# Patient Record
Sex: FEMALE | Race: WHITE | Employment: UNEMPLOYED | URBAN - NONMETROPOLITAN AREA
[De-identification: names, ages, dates, MRNs, and addresses within clinical notes are randomized per-mention and may not be internally consistent; named-entity substitution may affect disease eponyms.]

---

## 2021-08-02 ENCOUNTER — HOSPITAL ENCOUNTER (OUTPATIENT)
Dept: GENERAL RADIOLOGY | Age: 56
Discharge: HOME | End: 2021-08-02
Payer: COMMERCIAL

## 2021-08-02 ENCOUNTER — HOSPITAL ENCOUNTER (OUTPATIENT)
Age: 56
Discharge: HOME | End: 2021-08-02
Payer: COMMERCIAL

## 2021-08-02 DIAGNOSIS — R10.9 ACUTE ABDOMINAL PAIN: ICD-10-CM

## 2021-08-02 PROCEDURE — 74022 RADEX COMPL AQT ABD SERIES: CPT

## 2021-08-28 ENCOUNTER — HOSPITAL ENCOUNTER (OUTPATIENT)
Dept: CT IMAGING | Age: 56
Discharge: HOME | End: 2021-08-28

## 2021-08-28 DIAGNOSIS — R19.00 MASS OF PELVIS: ICD-10-CM

## 2021-08-28 PROCEDURE — 74177 CT ABD & PELVIS W/CONTRAST: CPT

## 2021-08-28 PROCEDURE — 6360000004 HC RX CONTRAST MEDICATION: Performed by: FAMILY MEDICINE

## 2021-08-28 RX ADMIN — IOHEXOL 50 ML: 240 INJECTION, SOLUTION INTRATHECAL; INTRAVASCULAR; INTRAVENOUS; ORAL at 10:32

## 2021-08-28 RX ADMIN — IOPAMIDOL 85 ML: 755 INJECTION, SOLUTION INTRAVENOUS at 10:32

## 2022-01-31 ENCOUNTER — TELEPHONE (OUTPATIENT)
Dept: FAMILY MEDICINE CLINIC | Age: 57
End: 2022-01-31

## 2022-01-31 NOTE — TELEPHONE ENCOUNTER
----- Message from Wagner Ahuja sent at 1/31/2022  1:11 PM EST -----  Subject: Appointment Request    Reason for Call: Routine Physical Exam with PAP    QUESTIONS  Type of Appointment? New Patient/New to Provider  Reason for appointment request? No appointments available during search  Additional Information for Provider? looking to establish care has   medicaid and would like to find some one - in her area she has called   around and would like a call back and try to get her in asap.   ---------------------------------------------------------------------------  --------------  [address]  What is the best way for the office to contact you? OK to leave message on   voicemail  Preferred Call Back Phone Number? [phone]  ---------------------------------------------------------------------------  --------------  SCRIPT ANSWERS  Relationship to Patient? Self  Specialty Confirmation? Primary Care  (If the patient has Medicare as their primary insurance coverage ask this   question) Are you requesting a Medicare Annual Wellness Visit? No  (Is the patient requesting a pap smear with their physical exam?)? Yes  (Is the patient requesting their annual physical and does not need PAP or   AWV per above?)? No  Have you been diagnosed with, awaiting test results for, or told that you   are suspected of having COVID-19 (Coronavirus)? (If patient has tested   negative or was tested as a requirement for work, school, or travel and   not based on symptoms, answer no)? No  Within the past two weeks have you developed any of the following symptoms   (answer no if symptoms have been present longer than 2 weeks or began   more than 2 weeks ago)? Fever or Chills, Cough, Shortness of breath or   difficulty breathing, Loss of taste or smell, Sore throat, Nasal   congestion, Sneezing or runny nose, Fatigue or generalized body aches   (answer no if pain is specific to a body part e.g. back pain), Diarrhea,   Headache?  No  Have you had close contact with someone with COVID-19 in the last 14 days? No  (Service Expert  click yes below to proceed with "[employer] As Usual   Scheduling)?  Yes

## 2022-02-01 ENCOUNTER — TELEPHONE (OUTPATIENT)
Dept: FAMILY MEDICINE CLINIC | Age: 57
End: 2022-02-01

## 2022-02-01 NOTE — TELEPHONE ENCOUNTER
Future Appointments   Date Time Provider [facility]   2/15/2022  1:00 PM DO RON [facility]

## 2022-02-01 NOTE — TELEPHONE ENCOUNTER
----- Message from Mami Milian sent at 1/31/2022  1:56 PM EST -----  Subject: Message to Provider    QUESTIONS  Information for Provider? Patient wants to know if Raffi Henry DO at   [facility] accepts her [address], insurance says there   should be no copay for pcp visits, tried to input [id number] [facility] in   chart but not responding correctly -- patient will bring info to visit   ---------------------------------------------------------------------------  --------------  [address]  What is the best way for the office to contact you? OK to leave message on   voicemail  Preferred Call Back Phone Number? [phone]  ---------------------------------------------------------------------------  --------------  SCRIPT ANSWERS  Relationship to Patient?  Self

## 2022-02-01 NOTE — TELEPHONE ENCOUNTER
Spoke with pt to inform her we do take [facility]. Requested pt to check with her insurance to be sure Dr. Sarmad Jacobs is on her plan.

## 2022-02-15 ENCOUNTER — OFFICE VISIT (OUTPATIENT)
Dept: FAMILY MEDICINE CLINIC | Age: 57
End: 2022-02-15
Payer: COMMERCIAL

## 2022-02-15 VITALS
RESPIRATION RATE: 16 BRPM | WEIGHT: 139.6 LBS | HEIGHT: 60 IN | BODY MASS INDEX: 27.41 KG/M2 | HEART RATE: 88 BPM | SYSTOLIC BLOOD PRESSURE: 120 MMHG | DIASTOLIC BLOOD PRESSURE: 68 MMHG | TEMPERATURE: 97.3 F | OXYGEN SATURATION: 99 %

## 2022-02-15 DIAGNOSIS — R10.12 LEFT UPPER QUADRANT ABDOMINAL PAIN: ICD-10-CM

## 2022-02-15 DIAGNOSIS — A04.72 C. DIFFICILE COLITIS: Primary | ICD-10-CM

## 2022-02-15 PROCEDURE — G8427 DOCREV CUR MEDS BY ELIG CLIN: HCPCS | Performed by: FAMILY MEDICINE

## 2022-02-15 PROCEDURE — 1036F TOBACCO NON-USER: CPT | Performed by: FAMILY MEDICINE

## 2022-02-15 PROCEDURE — 99204 OFFICE O/P NEW MOD 45 MIN: CPT | Performed by: FAMILY MEDICINE

## 2022-02-15 PROCEDURE — G8419 CALC BMI OUT NRM PARAM NOF/U: HCPCS | Performed by: FAMILY MEDICINE

## 2022-02-15 PROCEDURE — G8484 FLU IMMUNIZE NO ADMIN: HCPCS | Performed by: FAMILY MEDICINE

## 2022-02-15 PROCEDURE — 3017F COLORECTAL CA SCREEN DOC REV: CPT | Performed by: FAMILY MEDICINE

## 2022-02-15 RX ORDER — METRONIDAZOLE 500 MG/1
500 TABLET ORAL 3 TIMES DAILY
Qty: 30 TABLET | Refills: 0 | Status: SHIPPED | OUTPATIENT
Start: 2022-02-15 | End: 2022-02-15 | Stop reason: SDUPTHER

## 2022-02-15 RX ORDER — METRONIDAZOLE 500 MG/1
500 TABLET ORAL 3 TIMES DAILY
Qty: 30 TABLET | Refills: 0 | Status: SHIPPED | OUTPATIENT
Start: 2022-02-15 | End: 2022-02-25

## 2022-02-15 RX ORDER — PANTOPRAZOLE SODIUM 40 MG/1
40 GRANULE, DELAYED RELEASE ORAL
COMMUNITY

## 2022-02-15 SDOH — ECONOMIC STABILITY: FOOD INSECURITY: WITHIN THE PAST 12 MONTHS, YOU WORRIED THAT YOUR FOOD WOULD RUN OUT BEFORE YOU GOT MONEY TO BUY MORE.: SOMETIMES TRUE

## 2022-02-15 SDOH — ECONOMIC STABILITY: FOOD INSECURITY: WITHIN THE PAST 12 MONTHS, THE FOOD YOU BOUGHT JUST DIDN'T LAST AND YOU DIDN'T HAVE MONEY TO GET MORE.: SOMETIMES TRUE

## 2022-02-15 ASSESSMENT — PATIENT HEALTH QUESTIONNAIRE - PHQ9
2. FEELING DOWN, DEPRESSED OR HOPELESS: 0
SUM OF ALL RESPONSES TO PHQ QUESTIONS 1-9: 0
1. LITTLE INTEREST OR PLEASURE IN DOING THINGS: 0
SUM OF ALL RESPONSES TO PHQ QUESTIONS 1-9: 0
SUM OF ALL RESPONSES TO PHQ9 QUESTIONS 1 & 2: 0

## 2022-02-15 ASSESSMENT — SOCIAL DETERMINANTS OF HEALTH (SDOH): HOW HARD IS IT FOR YOU TO PAY FOR THE VERY BASICS LIKE FOOD, HOUSING, MEDICAL CARE, AND HEATING?: SOMEWHAT HARD

## 2022-02-15 NOTE — PROGRESS NOTES
visit: C. difficile colitis  -     Discontinue: metroNIDAZOLE (FLAGYL) 500 MG tablet; Take 1 tablet by mouth 3 times daily for 10 days  -     metroNIDAZOLE (FLAGYL) 500 MG tablet; Take 1 tablet by mouth 3 times daily for 10 days    Left upper quadrant abdominal pain        Return in about 6 weeks (around 3/29/2022).     -Sx consistent with adhesions vs gastritis vs IBS vs other  -start above treatments and follow up with GI  -ER precautions given today  -Patient advised to contact our office immediately if symptoms worsen or persist  -Patient counseled on conservative care including PO intake, rest and OTC meds

## 2022-02-16 ENCOUNTER — TELEPHONE (OUTPATIENT)
Dept: FAMILY MEDICINE CLINIC | Age: 57
End: 2022-02-16

## 2022-02-16 NOTE — TELEPHONE ENCOUNTER
Pt called in very frustrated and stated     \" I have never been to [facility] for my medications. I want to know how they have all my information for [facility]. I told them I want the Metronidazole canceled there and sent to [facility]. I had to tpay out of pocket at [facility] because [facility] still filled the medication and [facility] covered it through [facility]. I want it canceled at [facility] and [facility] refunded. I am pretty pissed because I am out the money now and can not get the money back that I paid for this medication.  \"       I informed pt that it looked like it was discontinued for [facility] and sent to [facility] and she stated \"that someone needs to call and tell them that it needs to be canceld and refund [employer]\"    Please advise

## 2022-02-23 NOTE — TELEPHONE ENCOUNTER
I spoke with the pharmacy at [facility] and it was advised that the prescription sent there was cancelled and that it did not bill her insurance.

## 2022-04-01 ENCOUNTER — OFFICE VISIT (OUTPATIENT)
Dept: FAMILY MEDICINE CLINIC | Age: 57
End: 2022-04-01
Payer: COMMERCIAL

## 2022-04-01 VITALS
WEIGHT: 139.4 LBS | DIASTOLIC BLOOD PRESSURE: 78 MMHG | SYSTOLIC BLOOD PRESSURE: 118 MMHG | BODY MASS INDEX: 27.37 KG/M2 | HEIGHT: 60 IN | RESPIRATION RATE: 14 BRPM | TEMPERATURE: 97.3 F | OXYGEN SATURATION: 98 % | HEART RATE: 96 BPM

## 2022-04-01 DIAGNOSIS — R10.12 LEFT UPPER QUADRANT ABDOMINAL PAIN: Primary | ICD-10-CM

## 2022-04-01 PROCEDURE — 99213 OFFICE O/P EST LOW 20 MIN: CPT | Performed by: FAMILY MEDICINE

## 2022-04-01 PROCEDURE — 1036F TOBACCO NON-USER: CPT | Performed by: FAMILY MEDICINE

## 2022-04-01 PROCEDURE — G8427 DOCREV CUR MEDS BY ELIG CLIN: HCPCS | Performed by: FAMILY MEDICINE

## 2022-04-01 PROCEDURE — G8419 CALC BMI OUT NRM PARAM NOF/U: HCPCS | Performed by: FAMILY MEDICINE

## 2022-04-01 PROCEDURE — 3017F COLORECTAL CA SCREEN DOC REV: CPT | Performed by: FAMILY MEDICINE

## 2022-04-01 RX ORDER — PANTOPRAZOLE SODIUM 40 MG/1
TABLET, DELAYED RELEASE ORAL
COMMUNITY
Start: 2022-02-20 | End: 2022-04-01

## 2022-04-01 NOTE — PROGRESS NOTES
Domo Torre is a 60 y.o. female that presents for     Chief Complaint   Patient presents with    Follow-up     symptoms saira better       /78   Pulse 96   Temp 97.3 °F (36.3 °C) (Infrared)   Resp 14   Ht 5' (1.524 m)   Wt 139 lb 6.4 oz (63.2 kg)   SpO2 98%   BMI 27.22 kg/m²       HPI    Follow up of abdominal pain. Saw GI since last visit, no new findings. Has been taking levsin, feels like it helps a little bit, but still having the pain. C Diff sx have improved, but no change in pain.       Health Maintenance   Topic Date Due    Hepatitis C screen  Never done    COVID-19 Vaccine (1) Never done    HIV screen  Never done    DTaP/Tdap/Td vaccine (1 - Tdap) Never done    Diabetes screen  Never done    Lipid screen  Never done    Breast cancer screen  Never done    Shingles Vaccine (1 of 2) Never done    Flu vaccine (Season Ended) 2022    Depression Screen  02/15/2023    Colorectal Cancer Screen  2023    Hepatitis A vaccine  Aged Out    Hepatitis B vaccine  Aged Out    Hib vaccine  Aged Out    Meningococcal (ACWY) vaccine  Aged Out    Pneumococcal 0-64 years Vaccine  Aged Out       Past Medical History:   Diagnosis Date    Anxiety     Depression     Dysphagia     Gastric ulcer     GERD (gastroesophageal reflux disease)     Headache(784.0)     Shortness of breath        Past Surgical History:   Procedure Laterality Date     SECTION      COLONOSCOPY  ,     CYST REMOVAL  1982    ovarian    HYSTERECTOMY  2004    KNEE SURGERY      UPPER GASTROINTESTINAL ENDOSCOPY         Social History     Tobacco Use    Smoking status: Former Smoker     Packs/day: 0.10     Years: 2.00     Pack years: 0.20     Types: Cigarettes     Quit date: 2021     Years since quittin.5    Smokeless tobacco: Never Used    Tobacco comment: Pt is a social smoker   Substance Use Topics    Alcohol use: Yes     Comment: rarely    Drug use: No       Family History

## 2022-04-19 ENCOUNTER — OFFICE VISIT (OUTPATIENT)
Dept: SURGERY | Age: 57
End: 2022-04-19
Payer: COMMERCIAL

## 2022-04-19 ENCOUNTER — TELEPHONE (OUTPATIENT)
Dept: SURGERY | Age: 57
End: 2022-04-19

## 2022-04-19 VITALS
TEMPERATURE: 97.9 F | HEIGHT: 62 IN | SYSTOLIC BLOOD PRESSURE: 98 MMHG | WEIGHT: 139 LBS | BODY MASS INDEX: 25.58 KG/M2 | HEART RATE: 88 BPM | DIASTOLIC BLOOD PRESSURE: 62 MMHG

## 2022-04-19 DIAGNOSIS — K80.20 CALCULUS OF GALLBLADDER WITHOUT CHOLECYSTITIS WITHOUT OBSTRUCTION: ICD-10-CM

## 2022-04-19 DIAGNOSIS — R10.12 LEFT UPPER QUADRANT ABDOMINAL PAIN: Primary | ICD-10-CM

## 2022-04-19 PROCEDURE — 99203 OFFICE O/P NEW LOW 30 MIN: CPT | Performed by: SURGERY

## 2022-04-19 PROCEDURE — 1036F TOBACCO NON-USER: CPT | Performed by: SURGERY

## 2022-04-19 PROCEDURE — G8419 CALC BMI OUT NRM PARAM NOF/U: HCPCS | Performed by: SURGERY

## 2022-04-19 PROCEDURE — 3017F COLORECTAL CA SCREEN DOC REV: CPT | Performed by: SURGERY

## 2022-04-19 PROCEDURE — G8427 DOCREV CUR MEDS BY ELIG CLIN: HCPCS | Performed by: SURGERY

## 2022-04-19 NOTE — TELEPHONE ENCOUNTER
Patient scheduled for surgery with Dr. Sanjay Sheppard on 05- at 8:00am with an arrival of 6:30am.  Preop instructions and antibacterial soap given. Surgery consent signed.

## 2022-04-19 NOTE — PROGRESS NOTES
[address] [place] [facility] [address]  Dept: [phone]  Dept Fax: [phone]  Loc: [phone]    Visit Date: 2022    Robert Daily is a 60 y.o. female who presentstoday for:  Chief Complaint   Patient presents with   [facility] Cowden Surgical Consult     New Patient referred by Dr. Keena Fuentes for Left upper quadrant abdominal pain- poss adhesions- gallstones shown on CT scan 2021        HPI:     HPI 53-year-old white female whose actually had a several year history of left-sided abdominal pain. Patient has had colonoscopy has had an upper endoscopy and had a CT scan in August revealing either a very large gallstone of the gallbladder or possibly a calcified gallbladder wall. Patient has been having some mild symptoms of biliary colic but again most of her pain has been on the left side of the abdomen she has had a previous hysterectomy she does have irregular bowel habits with some symptoms suggestive of irritable bowel she has no known family history of gallbladder disease she has had no weight loss.   Patient states her first colonoscopy per Lilly Means caused her a lot of pain second colonoscopy per Micky Mcardle revealed no stricture of the bowel    Past Medical History:   Diagnosis Date    Anxiety     Depression     Dysphagia     Gallstones     Gastric ulcer     GERD (gastroesophageal reflux disease)     Headache(784.0)     Shortness of breath       Past Surgical History:   Procedure Laterality Date     SECTION      COLONOSCOPY  ,    Josefa Esparza      Dr. Candido Bear CYST REMOVAL  1982    ovarian    HYSTERECTOMY  2004    KNEE SURGERY      UPPER GASTROINTESTINAL ENDOSCOPY  2012    UPPER GASTROINTESTINAL ENDOSCOPY  2022    Dr. Martha Leon        Family History   Problem Relation Age of Onset    High Blood Pressure Mother     Heart Disease Mother     Irritable Bowel Syndrome Father     Colon Polyps Father     Dementia Father     Irritable Bowel Syndrome Sister         Social History     Tobacco Use    Smoking status: Former Smoker     Packs/day: 0.10     Years: 2.00     Pack years: 0.20     Types: Cigarettes     Quit date: 2021     Years since quittin.5    Smokeless tobacco: Never Used    Tobacco comment: Pt is a social smoker   Substance Use Topics    Alcohol use: Yes     Comment: rarely          Current Outpatient Medications   Medication Sig Dispense Refill    hyoscyamine (LEVSIN/SL) 125 MCG sublingual tablet DISSOLVE 1 TABLET UNDER TONGUE EVERY FOUR HOURS AS DIRECTED for abdominal pain      pantoprazole sodium (PROTONIX) 40 MG PACK packet Take 40 mg by mouth 2 times daily (before meals)      Probiotic Product (PROBIOTIC-10) CHEW Take by mouth      naproxen (NAPROSYN) 500 MG tablet Take 1 tablet by mouth 2 times daily as needed for Pain 30 tablet 3    estradiol (ESTRACE) 1 MG tablet Take 1 tablet by mouth 2 times daily. 60 tablet 3    Multiple Vitamins-Minerals (MULTI-VITAMIN GUMMIES PO) Take  by mouth daily. No current facility-administered medications for this visit. Allergies   Allergen Reactions    Adhesive Tape     Biaxin [Clarithromycin]     Cortisone     Morphine     Nexium [Esomeprazole Magnesium] Other (See Comments)     Nausea         Subjective:      Review of Systems   Constitutional: Positive for appetite change and fatigue. Negative for activity change, chills, diaphoresis, fever and unexpected weight change. HENT: Negative. Negative for congestion, dental problem, drooling, ear discharge, ear pain, facial swelling, hearing loss, mouth sores, nosebleeds, postnasal drip, rhinorrhea, sinus pressure, sinus pain, sneezing, sore throat, tinnitus, trouble swallowing and voice change. Eyes: Positive for photophobia. Negative for pain, discharge, redness, itching and visual disturbance. Respiratory: Positive for shortness of breath. Negative for apnea, cough, choking, chest tightness, wheezing and stridor. Cardiovascular: Negative. Negative for chest pain, palpitations and leg swelling. Gastrointestinal: Positive for abdominal distention, abdominal pain, constipation, diarrhea and nausea. Endocrine: Negative. Negative for cold intolerance, heat intolerance, polydipsia, polyphagia and polyuria. Genitourinary: Negative. Negative for decreased urine volume, difficulty urinating, dyspareunia, dysuria, enuresis, flank pain, frequency, genital sores, hematuria, menstrual problem, pelvic pain, urgency, vaginal bleeding, vaginal discharge and vaginal pain. Musculoskeletal: Negative for arthralgias, back pain, gait problem, joint swelling, myalgias, neck pain and neck stiffness. Skin: Negative for color change, pallor, rash and wound. Allergic/Immunologic: Negative. Negative for environmental allergies, food allergies and immunocompromised state. Neurological: Negative. Negative for dizziness, tremors, seizures, syncope, facial asymmetry, speech difficulty, weakness, light-headedness, numbness and headaches. Hematological: Negative. Negative for adenopathy. Does not bruise/bleed easily. Psychiatric/Behavioral: Negative. Negative for agitation, behavioral problems, confusion, decreased concentration, dysphoric mood, hallucinations, self-injury, sleep disturbance and suicidal ideas. The patient is not nervous/anxious and is not hyperactive. Objective: There were no vitals taken for this visit. Physical Exam  Constitutional:       Appearance: She is well-developed. HENT:      Head: Normocephalic and atraumatic. Eyes:      General: No scleral icterus. Right eye: No discharge. Left eye: No discharge. Conjunctiva/sclera: Conjunctivae normal.      Pupils: Pupils are equal, round, and reactive to light. Neck:      Thyroid: No thyromegaly. Vascular: No JVD.    Cardiovascular:      Rate and Rhythm: Normal rate and regular rhythm. Heart sounds: No murmur heard. No friction rub. No gallop. Pulmonary:      Effort: Pulmonary effort is normal. No respiratory distress. Breath sounds: Normal breath sounds. No stridor. No wheezing. Chest:      Chest wall: No tenderness. Abdominal:      General: There is no distension. Palpations: There is no mass. Tenderness: There is abdominal tenderness. There is no rebound. Hernia: No hernia is present. Musculoskeletal:         General: Normal range of motion. Cervical back: Normal range of motion and neck supple. Skin:     General: Skin is warm and dry. Coloration: Skin is not pale. Findings: No erythema or rash. Neurological:      Mental Status: She is alert and oriented to person, place, and time. Psychiatric:         Behavior: Behavior normal.         Thought Content: Thought content normal.         Judgment: Judgment normal.          No results found for this or any previous visit. Assessment:     Vague left lower quadrant left-sided abdominal pain somewhat suggestive of possible irritable bowel. CT scan and we went over the CT slide by slide reveals no definitive abnormality of the left lower quadrant although she does have diverticulosis. I certainly think that her gallbladder is very diseased it either she has a large gallstone or on CT to be appears to be more of a calcified gallbladder wall we did discuss the small but definite risk of cancer with a porcelain gallbladder. I believe she would benefit from removing the gallbladder and then we can also view down into the left side of the abdominal wall left lower quadrant certainly could have adhesions she is agreeable and would like to proceed    Plan:     1. Schedule Raisa Bermudez for laparoscopic cholecystectomy possible open with diagnostic laparoscopy to view the left side of the abdomen/left lower quadrant  2.  The risks, benefits and alternatives were discussed with Varsha Joseph. She understands and wishes to proceed with surgical intervention. 3. Restrictions discussed with Varsha Joseph and she expresses understanding. 4. She is advised to call back directly if there are further questions/concerns, or if her symptoms worsen prior to surgery.             Electronicallysigned by Maria Elena Slade MD on 4/18/2022 at 8:42 PM

## 2022-04-19 NOTE — TELEPHONE ENCOUNTER
[address] [place], [address]    Phone * [phone]   Surgical Scheduling Direct line Phone * [phone]  Fax * [phone]      Melissa Baumann      1965    female    63 [address]   Marital Status:      Home Phone: [phone]   Cell Phone:   Telephone Information:   Mobile [phone]              Surgeon: Dr. Henny Cantu  Surgery Date:05-   Time: 8:00am     Procedure: Laparoscopic cholecystectomy with diagnostic laparoscopy of left lower quadrant possible open   Outpatient     Diagnosis: Cholelithiasis/ LLQ abdominal pain    Important Medical History: In Epic    Special Inst/Equip:     CPT Codes: 90845, 18824    Latex Allergy:   no Cardiac Device:  no    Anesthesia Type: General    Case Location:  Main OR     Preadmission Testing: Phone Call      PAT Date and Time: ________________________________    PAT Confirmation #: _________________________________    Post Op Visit:  ______________________________________    Need Preop Cardiac Clearance:   no    Does patient have Cardiologist/physician?  No      Surgery Conformation #:  ______________________________________________    : __________________________________ Date:____________________        [employer] Name:  [address]

## 2022-04-20 ASSESSMENT — ENCOUNTER SYMPTOMS
PHOTOPHOBIA: 1
NAUSEA: 1
EYE ITCHING: 0
EYE REDNESS: 0
SINUS PRESSURE: 0
ABDOMINAL DISTENTION: 1
EYE DISCHARGE: 0
STRIDOR: 0
COUGH: 0
VOICE CHANGE: 0
SORE THROAT: 0
CHEST TIGHTNESS: 0
ABDOMINAL PAIN: 1
CHOKING: 0
COLOR CHANGE: 0
CONSTIPATION: 1
SHORTNESS OF BREATH: 1
EYE PAIN: 0
DIARRHEA: 1
ALLERGIC/IMMUNOLOGIC NEGATIVE: 1
RHINORRHEA: 0
BACK PAIN: 0
WHEEZING: 0
APNEA: 0
SINUS PAIN: 0
FACIAL SWELLING: 0
TROUBLE SWALLOWING: 0

## 2022-04-22 ENCOUNTER — TELEPHONE (OUTPATIENT)
Dept: SURGERY | Age: 57
End: 2022-04-22

## 2022-04-22 NOTE — TELEPHONE ENCOUNTER
Patient had blood work through [facility] on 02-. This is past the 3-month time frame for surgery. Patient to go to [facility] to have a repeat Hemoglobin/Hematocrit. Order faxed to [phone]. Patient voiced understanding.

## 2022-05-10 ENCOUNTER — PREP FOR PROCEDURE (OUTPATIENT)
Dept: SURGERY | Age: 57
End: 2022-05-10

## 2022-05-10 RX ORDER — SODIUM CHLORIDE 9 MG/ML
INJECTION, SOLUTION INTRAVENOUS CONTINUOUS
Status: CANCELLED | OUTPATIENT
Start: 2022-05-10

## 2022-05-26 NOTE — H&P
[address] [address]  History and Physical Update      Pt Name: Kat Andrade  MRN: [id number]  YOB: 1965  Date of evaluation: 5/26/2022    [x] I have examined the patient and reviewed the H&P/Consult and there are no changes to the patient or plans. [] I have examined the patient and reviewed the H&P/Consult and have noted the following changes:         Hans Palacios MD  Electronically signed 5/26/2022 at 11:03 AM
CORTISONE, MORPHINE, NEXIUM. PHYSICAL EXAMINATION:  GENERAL:  The patient is a 66-year-old white female appears her age. HEENT:  Head, ears, eyes, nose and throat show no scleral icterus. CARDIOVASCULAR:  S1, S2.  Respirations are clear. ABDOMEN:  Soft. Bowel sounds are positive. Does have pain to palpation  really left subcostal.  No palpable masses. EXTREMITIES:  Within normal limits. ASSESSMENT AND PLAN:  1. Vague left lower quadrant left-sided abdominal pain, possible  irritable bowel. CT scan shows no abnormalities in the left lower  quadrant other than diverticulosis. However, her gallbladder shows  large gallstone or possibly a calcified gallbladder. I discussed with  the patient her gallbladder is diseased, unlikely her gallbladder is  causing her symptoms, would be reasonable to remove because of potential  for cancer from the calcified gallbladder. She voices understanding,  would like to proceed. We will plan on reviewing and looking into the  left lower quadrant. MAVERICK PEREYRA [facility], MD    D: 05/26/2022 11:13:01       T: 05/26/2022 11:16:31     [id number]  Job#: [id number]     Doc#: [id number]    CC:

## 2022-05-27 ENCOUNTER — ANESTHESIA EVENT (OUTPATIENT)
Dept: OPERATING ROOM | Age: 57
DRG: 263 | End: 2022-05-27
Payer: COMMERCIAL

## 2022-05-27 ENCOUNTER — APPOINTMENT (OUTPATIENT)
Dept: GENERAL RADIOLOGY | Age: 57
DRG: 263 | End: 2022-05-27
Attending: SURGERY
Payer: COMMERCIAL

## 2022-05-27 ENCOUNTER — ANESTHESIA (OUTPATIENT)
Dept: OPERATING ROOM | Age: 57
DRG: 263 | End: 2022-05-27
Payer: COMMERCIAL

## 2022-05-27 ENCOUNTER — HOSPITAL ENCOUNTER (INPATIENT)
Age: 57
LOS: 5 days | Discharge: HOME | DRG: 263 | End: 2022-06-01
Attending: SURGERY | Admitting: SURGERY
Payer: COMMERCIAL

## 2022-05-27 DIAGNOSIS — Z90.49 S/P LAPAROSCOPIC CHOLECYSTECTOMY: ICD-10-CM

## 2022-05-27 DIAGNOSIS — Z90.49 S/P SMALL BOWEL RESECTION: Primary | ICD-10-CM

## 2022-05-27 PROBLEM — K63.1 BOWEL PERFORATION (HCC): Status: ACTIVE | Noted: 2022-05-27

## 2022-05-27 LAB
EKG ATRIAL RATE: 64 BPM
EKG P AXIS: 53 DEGREES
EKG P-R INTERVAL: 124 MS
EKG Q-T INTERVAL: 410 MS
EKG QRS DURATION: 78 MS
EKG QTC CALCULATION (BAZETT): 422 MS
EKG R AXIS: 44 DEGREES
EKG T AXIS: 39 DEGREES
EKG VENTRICULAR RATE: 64 BPM

## 2022-05-27 PROCEDURE — 0DT80ZZ RESECTION OF SMALL INTESTINE, OPEN APPROACH: ICD-10-PCS | Performed by: SURGERY

## 2022-05-27 PROCEDURE — 47605 CHOLECYSTECTOMY W/CHOLANG: CPT | Performed by: SURGERY

## 2022-05-27 PROCEDURE — 2500000003 HC RX 250 WO HCPCS: Performed by: SURGERY

## 2022-05-27 PROCEDURE — 88304 TISSUE EXAM BY PATHOLOGIST: CPT

## 2022-05-27 PROCEDURE — 7100000000 HC PACU RECOVERY - FIRST 15 MIN: Performed by: SURGERY

## 2022-05-27 PROCEDURE — 6360000002 HC RX W HCPCS: Performed by: NURSE ANESTHETIST, CERTIFIED REGISTERED

## 2022-05-27 PROCEDURE — 2580000003 HC RX 258: Performed by: SURGERY

## 2022-05-27 PROCEDURE — 93005 ELECTROCARDIOGRAM TRACING: CPT | Performed by: ANESTHESIOLOGY

## 2022-05-27 PROCEDURE — 2500000003 HC RX 250 WO HCPCS: Performed by: NURSE ANESTHETIST, CERTIFIED REGISTERED

## 2022-05-27 PROCEDURE — 3700000001 HC ADD 15 MINUTES (ANESTHESIA): Performed by: SURGERY

## 2022-05-27 PROCEDURE — 6360000002 HC RX W HCPCS

## 2022-05-27 PROCEDURE — 0FT40ZZ RESECTION OF GALLBLADDER, OPEN APPROACH: ICD-10-PCS | Performed by: SURGERY

## 2022-05-27 PROCEDURE — 1200000000 HC SEMI PRIVATE

## 2022-05-27 PROCEDURE — 6360000002 HC RX W HCPCS: Performed by: SURGERY

## 2022-05-27 PROCEDURE — 0FJ44ZZ INSPECTION OF GALLBLADDER, PERCUTANEOUS ENDOSCOPIC APPROACH: ICD-10-PCS | Performed by: SURGERY

## 2022-05-27 PROCEDURE — 3700000000 HC ANESTHESIA ATTENDED CARE: Performed by: SURGERY

## 2022-05-27 PROCEDURE — 3209999900 FLUORO FOR SURGICAL PROCEDURES

## 2022-05-27 PROCEDURE — 2580000003 HC RX 258

## 2022-05-27 PROCEDURE — C1894 INTRO/SHEATH, NON-LASER: HCPCS | Performed by: SURGERY

## 2022-05-27 PROCEDURE — 0DNU0ZZ RELEASE OMENTUM, OPEN APPROACH: ICD-10-PCS | Performed by: SURGERY

## 2022-05-27 PROCEDURE — 3600000004 HC SURGERY LEVEL 4 BASE: Performed by: SURGERY

## 2022-05-27 PROCEDURE — 7100000001 HC PACU RECOVERY - ADDTL 15 MIN: Performed by: SURGERY

## 2022-05-27 PROCEDURE — 2720000010 HC SURG SUPPLY STERILE: Performed by: SURGERY

## 2022-05-27 PROCEDURE — 6360000002 HC RX W HCPCS: Performed by: NURSE PRACTITIONER

## 2022-05-27 PROCEDURE — 2709999900 HC NON-CHARGEABLE SUPPLY: Performed by: SURGERY

## 2022-05-27 PROCEDURE — 88307 TISSUE EXAM BY PATHOLOGIST: CPT

## 2022-05-27 PROCEDURE — 3600000014 HC SURGERY LEVEL 4 ADDTL 15MIN: Performed by: SURGERY

## 2022-05-27 PROCEDURE — 6360000004 HC RX CONTRAST MEDICATION: Performed by: SURGERY

## 2022-05-27 PROCEDURE — 93010 ELECTROCARDIOGRAM REPORT: CPT | Performed by: INTERNAL MEDICINE

## 2022-05-27 RX ORDER — SODIUM CHLORIDE 9 MG/ML
INJECTION, SOLUTION INTRAVENOUS PRN
Status: DISCONTINUED | OUTPATIENT
Start: 2022-05-27 | End: 2022-06-01 | Stop reason: HOSPADM

## 2022-05-27 RX ORDER — HYDRALAZINE HYDROCHLORIDE 20 MG/ML
10 INJECTION INTRAMUSCULAR; INTRAVENOUS
Status: DISCONTINUED | OUTPATIENT
Start: 2022-05-27 | End: 2022-05-27 | Stop reason: HOSPADM

## 2022-05-27 RX ORDER — ONDANSETRON 2 MG/ML
4 INJECTION INTRAMUSCULAR; INTRAVENOUS
Status: DISCONTINUED | OUTPATIENT
Start: 2022-05-27 | End: 2022-05-27 | Stop reason: HOSPADM

## 2022-05-27 RX ORDER — ONDANSETRON 2 MG/ML
INJECTION INTRAMUSCULAR; INTRAVENOUS PRN
Status: DISCONTINUED | OUTPATIENT
Start: 2022-05-27 | End: 2022-05-27 | Stop reason: SDUPTHER

## 2022-05-27 RX ORDER — SODIUM CHLORIDE 9 MG/ML
25 INJECTION, SOLUTION INTRAVENOUS PRN
Status: DISCONTINUED | OUTPATIENT
Start: 2022-05-27 | End: 2022-05-27 | Stop reason: HOSPADM

## 2022-05-27 RX ORDER — ENOXAPARIN SODIUM 100 MG/ML
40 INJECTION SUBCUTANEOUS EVERY 24 HOURS
Status: DISCONTINUED | OUTPATIENT
Start: 2022-05-28 | End: 2022-06-01 | Stop reason: HOSPADM

## 2022-05-27 RX ORDER — HYDROMORPHONE HCL 110MG/55ML
PATIENT CONTROLLED ANALGESIA SYRINGE INTRAVENOUS PRN
Status: DISCONTINUED | OUTPATIENT
Start: 2022-05-27 | End: 2022-05-27 | Stop reason: SDUPTHER

## 2022-05-27 RX ORDER — SODIUM CHLORIDE 0.9 % (FLUSH) 0.9 %
5-40 SYRINGE (ML) INJECTION EVERY 12 HOURS SCHEDULED
Status: DISCONTINUED | OUTPATIENT
Start: 2022-05-27 | End: 2022-05-27 | Stop reason: HOSPADM

## 2022-05-27 RX ORDER — ONDANSETRON 4 MG/1
4 TABLET, ORALLY DISINTEGRATING ORAL EVERY 8 HOURS PRN
Status: DISCONTINUED | OUTPATIENT
Start: 2022-05-27 | End: 2022-06-01 | Stop reason: HOSPADM

## 2022-05-27 RX ORDER — SODIUM CHLORIDE 0.9 % (FLUSH) 0.9 %
5-40 SYRINGE (ML) INJECTION EVERY 12 HOURS SCHEDULED
Status: DISCONTINUED | OUTPATIENT
Start: 2022-05-27 | End: 2022-06-01 | Stop reason: HOSPADM

## 2022-05-27 RX ORDER — SODIUM CHLORIDE 9 MG/ML
INJECTION, SOLUTION INTRAVENOUS CONTINUOUS
Status: DISCONTINUED | OUTPATIENT
Start: 2022-05-27 | End: 2022-05-27 | Stop reason: HOSPADM

## 2022-05-27 RX ORDER — FAMOTIDINE 10 MG/ML
20 INJECTION, SOLUTION INTRAVENOUS 2 TIMES DAILY
Status: DISCONTINUED | OUTPATIENT
Start: 2022-05-27 | End: 2022-06-01 | Stop reason: HOSPADM

## 2022-05-27 RX ORDER — IPRATROPIUM BROMIDE AND ALBUTEROL SULFATE 2.5; .5 MG/3ML; MG/3ML
1 SOLUTION RESPIRATORY (INHALATION)
Status: DISCONTINUED | OUTPATIENT
Start: 2022-05-27 | End: 2022-05-27 | Stop reason: HOSPADM

## 2022-05-27 RX ORDER — ONDANSETRON 2 MG/ML
4 INJECTION INTRAMUSCULAR; INTRAVENOUS EVERY 6 HOURS PRN
Status: DISCONTINUED | OUTPATIENT
Start: 2022-05-27 | End: 2022-06-01 | Stop reason: HOSPADM

## 2022-05-27 RX ORDER — DIPHENHYDRAMINE HYDROCHLORIDE 50 MG/ML
12.5 INJECTION INTRAMUSCULAR; INTRAVENOUS
Status: DISCONTINUED | OUTPATIENT
Start: 2022-05-27 | End: 2022-05-27 | Stop reason: HOSPADM

## 2022-05-27 RX ORDER — FENTANYL CITRATE 50 UG/ML
50 INJECTION, SOLUTION INTRAMUSCULAR; INTRAVENOUS EVERY 5 MIN PRN
Status: DISCONTINUED | OUTPATIENT
Start: 2022-05-27 | End: 2022-05-27 | Stop reason: HOSPADM

## 2022-05-27 RX ORDER — LABETALOL 20 MG/4 ML (5 MG/ML) INTRAVENOUS SYRINGE
10
Status: DISCONTINUED | OUTPATIENT
Start: 2022-05-27 | End: 2022-05-27 | Stop reason: HOSPADM

## 2022-05-27 RX ORDER — FENTANYL CITRATE 50 UG/ML
INJECTION, SOLUTION INTRAMUSCULAR; INTRAVENOUS PRN
Status: DISCONTINUED | OUTPATIENT
Start: 2022-05-27 | End: 2022-05-27 | Stop reason: SDUPTHER

## 2022-05-27 RX ORDER — KETOROLAC TROMETHAMINE 30 MG/ML
30 INJECTION, SOLUTION INTRAMUSCULAR; INTRAVENOUS
Status: COMPLETED | OUTPATIENT
Start: 2022-05-27 | End: 2022-05-27

## 2022-05-27 RX ORDER — LORAZEPAM 2 MG/ML
0.5 INJECTION INTRAMUSCULAR
Status: DISCONTINUED | OUTPATIENT
Start: 2022-05-27 | End: 2022-05-27 | Stop reason: HOSPADM

## 2022-05-27 RX ORDER — DEXAMETHASONE SODIUM PHOSPHATE 10 MG/ML
INJECTION, EMULSION INTRAMUSCULAR; INTRAVENOUS PRN
Status: DISCONTINUED | OUTPATIENT
Start: 2022-05-27 | End: 2022-05-27 | Stop reason: SDUPTHER

## 2022-05-27 RX ORDER — SODIUM CHLORIDE 0.9 % (FLUSH) 0.9 %
5-40 SYRINGE (ML) INJECTION PRN
Status: DISCONTINUED | OUTPATIENT
Start: 2022-05-27 | End: 2022-05-27 | Stop reason: HOSPADM

## 2022-05-27 RX ORDER — ROCURONIUM BROMIDE 10 MG/ML
INJECTION, SOLUTION INTRAVENOUS PRN
Status: DISCONTINUED | OUTPATIENT
Start: 2022-05-27 | End: 2022-05-27 | Stop reason: SDUPTHER

## 2022-05-27 RX ORDER — MIDAZOLAM HYDROCHLORIDE 1 MG/ML
INJECTION INTRAMUSCULAR; INTRAVENOUS PRN
Status: DISCONTINUED | OUTPATIENT
Start: 2022-05-27 | End: 2022-05-27 | Stop reason: SDUPTHER

## 2022-05-27 RX ORDER — PROPOFOL 10 MG/ML
INJECTION, EMULSION INTRAVENOUS PRN
Status: DISCONTINUED | OUTPATIENT
Start: 2022-05-27 | End: 2022-05-27 | Stop reason: SDUPTHER

## 2022-05-27 RX ORDER — DROPERIDOL 2.5 MG/ML
0.62 INJECTION, SOLUTION INTRAMUSCULAR; INTRAVENOUS
Status: DISCONTINUED | OUTPATIENT
Start: 2022-05-27 | End: 2022-05-27 | Stop reason: HOSPADM

## 2022-05-27 RX ORDER — BUPIVACAINE HYDROCHLORIDE 5 MG/ML
INJECTION, SOLUTION EPIDURAL; INTRACAUDAL PRN
Status: DISCONTINUED | OUTPATIENT
Start: 2022-05-27 | End: 2022-05-27 | Stop reason: HOSPADM

## 2022-05-27 RX ORDER — ESTRADIOL 1 MG/1
1 TABLET ORAL 2 TIMES DAILY
Status: DISCONTINUED | OUTPATIENT
Start: 2022-05-27 | End: 2022-05-30

## 2022-05-27 RX ORDER — FAMOTIDINE 20 MG/1
20 TABLET, FILM COATED ORAL 2 TIMES DAILY
Status: DISCONTINUED | OUTPATIENT
Start: 2022-05-27 | End: 2022-06-01 | Stop reason: HOSPADM

## 2022-05-27 RX ORDER — SODIUM CHLORIDE 9 MG/ML
INJECTION, SOLUTION INTRAVENOUS CONTINUOUS
Status: DISCONTINUED | OUTPATIENT
Start: 2022-05-27 | End: 2022-05-31

## 2022-05-27 RX ORDER — MEPERIDINE HYDROCHLORIDE 25 MG/ML
12.5 INJECTION INTRAMUSCULAR; INTRAVENOUS; SUBCUTANEOUS EVERY 5 MIN PRN
Status: DISCONTINUED | OUTPATIENT
Start: 2022-05-27 | End: 2022-05-27 | Stop reason: HOSPADM

## 2022-05-27 RX ORDER — LIDOCAINE HCL/PF 100 MG/5ML
SYRINGE (ML) INJECTION PRN
Status: DISCONTINUED | OUTPATIENT
Start: 2022-05-27 | End: 2022-05-27 | Stop reason: SDUPTHER

## 2022-05-27 RX ORDER — CEFTRIAXONE 1 G/1
INJECTION, POWDER, FOR SOLUTION INTRAMUSCULAR; INTRAVENOUS PRN
Status: DISCONTINUED | OUTPATIENT
Start: 2022-05-27 | End: 2022-05-27 | Stop reason: SDUPTHER

## 2022-05-27 RX ORDER — SODIUM CHLORIDE 0.9 % (FLUSH) 0.9 %
5-40 SYRINGE (ML) INJECTION PRN
Status: DISCONTINUED | OUTPATIENT
Start: 2022-05-27 | End: 2022-06-01 | Stop reason: HOSPADM

## 2022-05-27 RX ADMIN — Medication 0.5 MG: at 10:58

## 2022-05-27 RX ADMIN — PROPOFOL 150 MG: 10 INJECTION, EMULSION INTRAVENOUS at 08:16

## 2022-05-27 RX ADMIN — ROCURONIUM BROMIDE 20 MG: 50 INJECTION, SOLUTION INTRAVENOUS at 09:29

## 2022-05-27 RX ADMIN — ONDANSETRON 4 MG: 2 INJECTION INTRAMUSCULAR; INTRAVENOUS at 13:01

## 2022-05-27 RX ADMIN — ROCURONIUM BROMIDE 50 MG: 50 INJECTION, SOLUTION INTRAVENOUS at 08:16

## 2022-05-27 RX ADMIN — HYDROMORPHONE HYDROCHLORIDE 1 MG: 1 INJECTION, SOLUTION INTRAMUSCULAR; INTRAVENOUS; SUBCUTANEOUS at 22:53

## 2022-05-27 RX ADMIN — PIPERACILLIN AND TAZOBACTAM 3375 MG: 3; .375 INJECTION, POWDER, LYOPHILIZED, FOR SOLUTION INTRAVENOUS at 14:13

## 2022-05-27 RX ADMIN — FENTANYL CITRATE 50 MCG: 50 INJECTION, SOLUTION INTRAMUSCULAR; INTRAVENOUS at 08:36

## 2022-05-27 RX ADMIN — PIPERACILLIN AND TAZOBACTAM 3375 MG: 3; .375 INJECTION, POWDER, LYOPHILIZED, FOR SOLUTION INTRAVENOUS at 22:21

## 2022-05-27 RX ADMIN — SUGAMMADEX 200 MG: 100 INJECTION, SOLUTION INTRAVENOUS at 10:25

## 2022-05-27 RX ADMIN — HYDROMORPHONE HYDROCHLORIDE 0.5 MG: 2 INJECTION INTRAMUSCULAR; INTRAVENOUS; SUBCUTANEOUS at 10:18

## 2022-05-27 RX ADMIN — KETOROLAC TROMETHAMINE 30 MG: 30 INJECTION, SOLUTION INTRAMUSCULAR; INTRAVENOUS at 10:57

## 2022-05-27 RX ADMIN — HYDROMORPHONE HYDROCHLORIDE 0.5 MG: 1 INJECTION, SOLUTION INTRAMUSCULAR; INTRAVENOUS; SUBCUTANEOUS at 10:58

## 2022-05-27 RX ADMIN — HYDROMORPHONE HYDROCHLORIDE 0.5 MG: 1 INJECTION, SOLUTION INTRAMUSCULAR; INTRAVENOUS; SUBCUTANEOUS at 19:36

## 2022-05-27 RX ADMIN — FENTANYL CITRATE 50 MCG: 50 INJECTION, SOLUTION INTRAMUSCULAR; INTRAVENOUS at 08:40

## 2022-05-27 RX ADMIN — Medication 80 MG: at 08:16

## 2022-05-27 RX ADMIN — ONDANSETRON 4 MG: 2 INJECTION INTRAMUSCULAR; INTRAVENOUS at 19:36

## 2022-05-27 RX ADMIN — FENTANYL CITRATE 100 MCG: 50 INJECTION, SOLUTION INTRAMUSCULAR; INTRAVENOUS at 08:16

## 2022-05-27 RX ADMIN — CEFTRIAXONE SODIUM 1 G: 1 INJECTION, POWDER, FOR SOLUTION INTRAMUSCULAR; INTRAVENOUS at 09:50

## 2022-05-27 RX ADMIN — ONDANSETRON 4 MG: 2 INJECTION INTRAMUSCULAR; INTRAVENOUS at 10:25

## 2022-05-27 RX ADMIN — DEXAMETHASONE SODIUM PHOSPHATE 10 MG: 10 INJECTION, EMULSION INTRAMUSCULAR; INTRAVENOUS at 08:31

## 2022-05-27 RX ADMIN — HYDROMORPHONE HYDROCHLORIDE 0.5 MG: 1 INJECTION, SOLUTION INTRAMUSCULAR; INTRAVENOUS; SUBCUTANEOUS at 16:19

## 2022-05-27 RX ADMIN — PROPOFOL 50 MG: 10 INJECTION, EMULSION INTRAVENOUS at 08:33

## 2022-05-27 RX ADMIN — MIDAZOLAM 2 MG: 1 INJECTION INTRAMUSCULAR; INTRAVENOUS at 08:11

## 2022-05-27 RX ADMIN — HYDROMORPHONE HYDROCHLORIDE 0.5 MG: 1 INJECTION, SOLUTION INTRAMUSCULAR; INTRAVENOUS; SUBCUTANEOUS at 12:56

## 2022-05-27 RX ADMIN — FAMOTIDINE 20 MG: 10 INJECTION, SOLUTION INTRAVENOUS at 19:46

## 2022-05-27 RX ADMIN — SODIUM CHLORIDE: 9 INJECTION, SOLUTION INTRAVENOUS at 07:42

## 2022-05-27 RX ADMIN — SODIUM CHLORIDE: 9 INJECTION, SOLUTION INTRAVENOUS at 12:03

## 2022-05-27 ASSESSMENT — PAIN SCALES - GENERAL
PAINLEVEL_OUTOF10: 4
PAINLEVEL_OUTOF10: 10
PAINLEVEL_OUTOF10: 7
PAINLEVEL_OUTOF10: 4
PAINLEVEL_OUTOF10: 10
PAINLEVEL_OUTOF10: 7
PAINLEVEL_OUTOF10: 5
PAINLEVEL_OUTOF10: 5
PAINLEVEL_OUTOF10: 6
PAINLEVEL_OUTOF10: 7
PAINLEVEL_OUTOF10: 7
PAINLEVEL_OUTOF10: 6
PAINLEVEL_OUTOF10: 3

## 2022-05-27 ASSESSMENT — PAIN DESCRIPTION - ORIENTATION
ORIENTATION: LEFT;MID
ORIENTATION: LOWER;MID
ORIENTATION: RIGHT;MID
ORIENTATION: LOWER;MID

## 2022-05-27 ASSESSMENT — PAIN DESCRIPTION - FREQUENCY
FREQUENCY: INTERMITTENT
FREQUENCY: INTERMITTENT

## 2022-05-27 ASSESSMENT — ENCOUNTER SYMPTOMS: SHORTNESS OF BREATH: 1

## 2022-05-27 ASSESSMENT — PAIN DESCRIPTION - DESCRIPTORS
DESCRIPTORS: ACHING
DESCRIPTORS: ACHING
DESCRIPTORS: SORE
DESCRIPTORS: ACHING
DESCRIPTORS: ACHING;PRESSURE

## 2022-05-27 ASSESSMENT — PAIN DESCRIPTION - ONSET
ONSET: ON-GOING
ONSET: ON-GOING

## 2022-05-27 ASSESSMENT — PAIN DESCRIPTION - LOCATION
LOCATION: ABDOMEN

## 2022-05-27 ASSESSMENT — PAIN DESCRIPTION - PAIN TYPE
TYPE: SURGICAL PAIN
TYPE: SURGICAL PAIN

## 2022-05-27 ASSESSMENT — PAIN - FUNCTIONAL ASSESSMENT: PAIN_FUNCTIONAL_ASSESSMENT: ACTIVITIES ARE NOT PREVENTED

## 2022-05-27 NOTE — ANESTHESIA PRE PROCEDURE
Department of Anesthesiology  Preprocedure Note       Name:  Vannessa Natarajan   Age:  59 y.o.  :  1965                                          MRN:  [id number]         Date:  2022      Surgeon: Monik Ruiz):  Karine Anders MD    Procedure: Procedure(s):  Laparoscopic Cholecystectomy with Diagnostic Laparoscopy of Left Lower Quadrant possible Open    Medications prior to admission:   Prior to Admission medications    Medication Sig Start Date End Date Taking? Authorizing Provider   hyoscyamine (LEVSIN/SL) 125 MCG sublingual tablet DISSOLVE 1 TABLET UNDER TONGUE EVERY FOUR HOURS AS DIRECTED for abdominal pain 3/10/22   Historical Provider, MD   pantoprazole sodium (PROTONIX) 40 MG PACK packet Take 40 mg by mouth every morning (before breakfast)     Historical Provider, MD   Probiotic Product (PROBIOTIC-10) CHEW Take by mouth    Historical Provider, MD   naproxen (NAPROSYN) 500 MG tablet Take 1 tablet by mouth 2 times daily as needed for Pain  Patient not taking: Reported on 2022 7/13/15   DIXON Wright   estradiol (ESTRACE) 1 MG tablet Take 1 tablet by mouth 2 times daily. Patient taking differently: Take 2 mg by mouth daily  14   Daisy Sorensen DO       Current medications:    Current Facility-Administered Medications   Medication Dose Route Frequency Provider Last Rate Last Admin    0.9 % sodium chloride infusion   IntraVENous Continuous Dior Tyler LPN           Allergies:     Allergies   Allergen Reactions    Adhesive Tape     Biaxin [Clarithromycin]     Morphine     Nexium [Esomeprazole Magnesium] Other (See Comments)     Nausea      Cortisone Rash     as       Problem List:    Patient Active Problem List   Diagnosis Code    Hot flashes R23.2       Past Medical History:        Diagnosis Date    Anxiety     Depression     Dysphagia     Gallstones     Gastric ulcer     GERD (gastroesophageal reflux disease)     Headache(784.0)     Prolonged emergence from general anesthesia     Shortness of breath        Past Surgical History:        Procedure Laterality Date     SECTION      COLONOSCOPY  ,    [place]  2002    Dr. January Ford    CYST REMOVAL  1982    ovarian    HYSTERECTOMY  2004    KNEE SURGERY      UPPER GASTROINTESTINAL ENDOSCOPY  2012    UPPER GASTROINTESTINAL ENDOSCOPY  2022    Dr. Jodi Beasley       Social History:    Social History     Tobacco Use    Smoking status: Former Smoker     Packs/day: 0.10     Years: 2.00     Pack years: 0.20     Types: Cigarettes     Quit date: 2021     Years since quittin.6    Smokeless tobacco: Never Used    Tobacco comment: Pt is a social smoker   Substance Use Topics    Alcohol use: Yes     Comment: rarely                                Counseling given: Not Answered  Comment: Pt is a social smoker      Vital Signs (Current): There were no vitals filed for this visit. BP Readings from Last 3 Encounters:   22 98/62   22 118/78   02/15/22 120/68       NPO Status: Time of last liquid consumption:                         Time of last solid consumption:                         Date of last liquid consumption: 22                        Date of last solid food consumption: 22    BMI:   Wt Readings from Last 3 Encounters:   22 139 lb (63 kg)   22 139 lb 6.4 oz (63.2 kg)   02/15/22 139 lb 9.6 oz (63.3 kg)     There is no height or weight on file to calculate BMI.    CBC: No results found for: WBC, RBC, HGB, HCT, MCV, RDW, PLT    CMP: No results found for: NA, K, CL, CO2, BUN, CREATININE, GFRAA, AGRATIO, LABGLOM, GLUCOSE, GLU, PROT, CALCIUM, BILITOT, ALKPHOS, AST, ALT    POC Tests: No results for input(s): POCGLU, POCNA, POCK, POCCL, POCBUN, POCHEMO, POCHCT in the last 72 hours.     Coags: No results found for: PROTIME, INR, APTT    HCG (If Applicable): No results found for: PREGTESTUR, PREGSERUM, HCG, HCGQUANT     ABGs: No results found for: PHART, PO2ART, PJK5XKE, SZE8RHG, BEART, C1CQLJQJ     Type & Screen (If Applicable):  No results found for: LABABO, LABRH    Drug/Infectious Status (If Applicable):  No results found for: HIV, HEPCAB    COVID-19 Screening (If Applicable): No results found for: COVID19        Anesthesia Evaluation     history of anesthetic complications: PONV. Airway: Mallampati: II          Dental:          Pulmonary:   (+) shortness of breath:                             Cardiovascular:                      Neuro/Psych:   (+) headaches:, psychiatric history:            GI/Hepatic/Renal:   (+) GERD:, PUD,           Endo/Other:                     Abdominal:             Vascular: Other Findings:           Anesthesia Plan      general     ASA 2       Induction: intravenous. Anesthetic plan and risks discussed with patient.                         London Bobo MD   5/27/2022

## 2022-05-27 NOTE — PROGRESS NOTES
Patient admitted from PACU to room 5e59 with  present. Patient came with 0.9 ns running at 100 ml /hr   Patient is on 1 liter of oxygen at 94%. Post op orders gone over with patient and  as well as room orientation.   They voiced understanding,  No concerns noted at this time

## 2022-05-27 NOTE — OP NOTE
[address] [place] [address]                                OPERATIVE REPORT    PATIENT NAME: Sunny Yanez                      :        1965  MED REC NO:   [id number]                           ROOM:  ACCOUNT NO:   [de-identified]                           ADMIT DATE: 2022  PROVIDER:     Tre Miller. MD Helen    DATE OF PROCEDURE:  2022    PREOPERATIVE DIAGNOSES:  1. Cholelithiasis. 2.  Left-sided abdominal pain. POSTOPERATIVE DIAGNOSES:  1. Cholelithiasis. 2.  Left-sided abdominal pain with severe adhesive disease. OPERATIONS:  1. Laparoscopic cholecystectomy with cholangiogram.  2.  Laparoscopic lysis of adhesions with conversion to open. Mini-laparotomy, lysis of adhesions, segmental small bowel resection. SURGEON:  Virl Remedies. Naomie Saucedo MD    ANESTHESIA:  General.    COMPLICATIONS:  Need for open with small bowel tear. BLOOD LOSS:  50 mL. INDICATION FOR PROCEDURE:  The patient is a 59-year-old female who has  been having persistent left-sided abdominal pain going down to the lower  abdomen, but on workup was found to have cholelithiasis, milk of calcium  bile, possible gallbladder wall calcification. The pain in the left  side had persisted and it was felt that laparoscopy was warranted  feeling that adhesions might be likely but we should remove the  gallbladder at the same time. FINDINGS:  The patient had difficult gallbladder dissection due to  reaction in the hepatoduodenal ligament. Cholangiogram was performed,  although there was no proximal filling of the hepatic radicals, although  we verified that the large dilated cystic duct was going into the  gallbladder. Then, there were adhesions in the left lower abdominal  wall and into the lower pelvis.   In the process of lysing the adhesions  laparoscopically, there was a serosal tear plus we could not get the  other adhesions down due to bowel being tight against the peritoneum. We felt we should do a mini laparotomy to make sure that bowel was okay  and also to finish the adhesion lysis. In the process of lysing the  rest of the adhesions, there was again a tear of the small bowel and it  was elected to resect as we began to close the defect that seemed to be  creating a stricture. DESCRIPTION OF THE PROCEDURE:  The patient was brought to the operating  suite, placed supine on the operating room table. After adequate  inhalational anesthesia was administered, the patient's abdomen was  prepped and draped in usual sterile fashion. Incision was made below  the umbilicus. A Veress needle was placed, and CO2 was insufflated to a  pressure of 15. Veress needle was removed. A trocar was placed, and a  camera was placed in the trocar verifying intra-abdominal placement of  trocar. The other three trocars, one in the epigastrium and two  laterally were placed, and the liver was viewed, and then we grabbed the  dome of the gallbladder and elevated it superiorly, and there were  omental adhesions up to the gallbladder. These were stripped down. We  then grabbed the neck of the gallbladder, retracted it laterally  obtaining the critical view. The dissection in this area though;  however, was difficult as there was a lot of reaction. It was difficult  to actually get what appeared to be the neck of the gallbladder off a  very long cystic duct. We continued with our dissection. We had this  rather dilated duct. We verified that it was not the common duct. It  was going right up into the neck of the gallbladder. In fact, we had a  rather wide plane between the liver as we dissected the gallbladder off  the gallbladder bed and this tubular structure.   However, I wanted to do  a cholangiogram as we placed initially a clip on the proximal side of  this, made a rent in the cystic duct and placed a cholangiocath as we  tried to inject saline, it would just squirt around. I elected to place  a 12-mm port epigastrically so I can get an extra large clip applier and  we were able to clip the cholangiocath without extravasation. We then  performed a cholangiogram; however, nothing went proximal and went  distally into the duodenum. Again at this point in time, I removed the  rest of the gallbladder off the gallbladder bed, so that I had the  gallbladder neck hanging by this long tubular structure. It was clear  that we had no injury to the common duct. And so we placed two extra  large clips across the cystic duct and divided it and removed the  specimen. At this point in time, we swung the scope around and put into  the epigastric port to view the left abdominal wall in the lower  abdomen. There was clearly tight omental adhesions when we began the  lysis of adhesions of omentum. As we got down towards the midline, as  we were lysing some omentum, there was some bowel that was there, and  there was a little tear in the serosa. We could also then see bowel  that was tightly adhered, and I felt that we should convert to a  mini-laparotomy to check this area of serosal tear and take the rest of  the adhesions down in the midline. The trocars were removed as air was  aspirated out of the abdominal cavity. A midline incision was made from  the umbilicus down to the pubis, taken down through the subcutaneous  tissue to linea alba, and we made entrance into the abdominal cavity. There was some immediately and further omental adhesions that were taken  down, and then there was very tight small bowel adhered up to the  peritoneum. This was right at the midline lower  incision.   We  found the area of serosal tear of the bowel, but then this part of the  bowel was completely adhered up to the peritoneum, and in the process of  dissecting it away, there was tear made in the small bowel, but we  continued to free it up, which was quite difficult, then we had this  segment of bowel completely free. We began this doing a suture repair  of this enterotomy, and it was clear that we were probably going to  stricturing it down, so then we elected to just resect this part of the  bowel. It should also be noted when we entered into the abdominal  cavity, there was no evidence of any succus that had spilled to that  point. We fired AGATHA on each side of this rent dividing the small bowel  and then took the mesentery down with Alice clamps and removed this from  3 inches of specimen of small bowel. We then did a side-to-side  anastomosis with the AGATHA and closed the opening with TA-60 stapler. We  continued to free up some loops of small bowel that all the adhesions in  the abdomen were free, and we also then resected a little portion of the  omentum that had been stuck down into the pelvis. We irrigated with  IrriSept.  We then re-draped, re-gowned and gloved and closure was  begun. We suctioned the IrriSept out. #1 Vicryl was used to close the  muscle, running 4-0 Vicryl used to close the skin, and the other trocar  ports were closed with 4-0 Vicryl. The patient tolerated the procedure  well. MAVERICK PEREYRA [facility], MD    D: 05/27/2022 11:08:08       T: 05/27/2022 11:14:01     [email or web address]  Job#: [id number]     Doc#: [id number]    CC:

## 2022-05-27 NOTE — ANESTHESIA POSTPROCEDURE EVALUATION
Department of Anesthesiology  Postprocedure Note    Patient: Travon Peterson  MRN: [id number]  YOB: 1965  Date of evaluation: 5/27/2022  Time:  11:50 AM     Procedure Summary     Date: 05/27/22 Room / Location: [address] [place] [address]    Anesthesia Start: 0810 Anesthesia Stop: 9410    Procedure: Laparoscopic Cholecystectomy with Laparoscopy Lysis of Adhesions Converted to Open with Small Segemental Bowel Resection (N/A Abdomen) Diagnosis: (Cholelithiaisis LLQ abdominal pain)    Surgeons: Shila Srinivasan MD Responsible Provider: Tona Krishnamurthy MD    Anesthesia Type: general ASA Status: 2          Anesthesia Type: No value filed. Yehuda Phase I: Yehuda Score: 8    Yehuda Phase II:      Last vitals: Reviewed and per EMR flowsheets.        Anesthesia Post Evaluation    Complications: no

## 2022-05-27 NOTE — PLAN OF CARE
Problem: Discharge Planning  Goal: Discharge to home or other facility with appropriate resources  Outcome: Progressing  Flowsheets (Taken 5/27/2022 1205)  Discharge to home or other facility with appropriate resources: Identify barriers to discharge with patient and caregiver     Problem: Pain  Goal: Verbalizes/displays adequate comfort level or baseline comfort level  Outcome: Progressing  Flowsheets (Taken 5/27/2022 1721)  Verbalizes/displays adequate comfort level or baseline comfort level:   Encourage patient to monitor pain and request assistance   Assess pain using appropriate pain scale   Administer analgesics based on type and severity of pain and evaluate response   Implement non-pharmacological measures as appropriate and evaluate response     Problem: Safety - Adult  Goal: Free from fall injury  Outcome: Progressing  Flowsheets (Taken 5/27/2022 1721)  Free From Fall Injury:   Instruct family/caregiver on patient safety   Based on caregiver fall risk screen, instruct family/caregiver to ask for assistance with transferring infant if caregiver noted to have fall risk factors     Problem: Nutrition Deficit:  Goal: Optimize nutritional status  Outcome: Progressing  Flowsheets (Taken 5/27/2022 1721)  Nutrient intake appropriate for improving, restoring, or maintaining nutritional needs: Assess nutritional status and recommend course of action   Care plan reviewed with patient and . Patient and  verbalize understanding of the plan of care and contribute to goal setting.

## 2022-05-27 NOTE — BRIEF OP NOTE
Brief Postoperative Note      Patient: Harrison Lovelace  YOB: 1965  MRN: [id number]    Date of Procedure: 5/27/2022    Pre-Op Diagnosis: Cholelithiaisis LLQ abdominal pain    Post-Op Diagnosis: same with severe adhesions       Procedure(s):  Laparoscopic Cholecystectomy with Laparoscopy Lysis of Adhesions Converted to Open with Small Segemental Bowel Resection    Surgeon(s):  Carlos Munoz MD    Assistant:  First Assistant: Afua Thayer RN    Anesthesia: General    Estimated Blood Loss (KI):41 ml    Complications: none    Specimens:   ID Type Source Tests Collected by Time Destination   A : Gallbladder Tissue Gallbladder SURGICAL PATHOLOGY Carlos Munoz MD 5/27/2022 9501    B : Segment of small bowel Tissue Ileum SURGICAL PATHOLOGY Carlos Munoz MD 5/27/2022 2563        Implants:        Drains:     Findings: see op note    Electronically signed by Carlos Munoz MD on 5/27/2022 at 10:37 AM

## 2022-05-27 NOTE — PROGRESS NOTES
ADMITTED TO [facility] AND ORIENTED TO UNIT. SCDS ON. FALL AND ALLERGY BANDS ON. PT VERBALIZED APPROVAL FOR FIRST NAME, LAST INITIAL AND PHYSICIAN NAME ON UNIT WHITEBOARD.

## 2022-05-28 LAB
ALBUMIN SERPL-MCNC: 3.9 G/DL (ref 3.5–5.1)
ALP BLD-CCNC: 47 U/L (ref 38–126)
ALT SERPL-CCNC: 68 U/L (ref 11–66)
AST SERPL-CCNC: 70 U/L (ref 5–40)
BASOPHILS # BLD: 0.2 %
BASOPHILS ABSOLUTE: 0 THOU/MM3 (ref 0–0.1)
BILIRUB SERPL-MCNC: 0.7 MG/DL (ref 0.3–1.2)
BILIRUBIN DIRECT: < 0.2 MG/DL (ref 0–0.3)
EOSINOPHIL # BLD: 0 %
EOSINOPHILS ABSOLUTE: 0 THOU/MM3 (ref 0–0.4)
ERYTHROCYTE [DISTWIDTH] IN BLOOD BY AUTOMATED COUNT: 12.6 % (ref 11.5–14.5)
ERYTHROCYTE [DISTWIDTH] IN BLOOD BY AUTOMATED COUNT: 41.3 FL (ref 35–45)
HCT VFR BLD CALC: 37.8 % (ref 37–47)
HEMOGLOBIN: 12.7 GM/DL (ref 12–16)
IMMATURE GRANS (ABS): 0.08 THOU/MM3 (ref 0–0.07)
IMMATURE GRANULOCYTES: 0.7 %
LYMPHOCYTES # BLD: 6.5 %
LYMPHOCYTES ABSOLUTE: 0.8 THOU/MM3 (ref 1–4.8)
MCH RBC QN AUTO: 30.7 PG (ref 26–33)
MCHC RBC AUTO-ENTMCNC: 33.6 GM/DL (ref 32.2–35.5)
MCV RBC AUTO: 91.3 FL (ref 81–99)
MONOCYTES # BLD: 6.2 %
MONOCYTES ABSOLUTE: 0.8 THOU/MM3 (ref 0.4–1.3)
NUCLEATED RED BLOOD CELLS: 0 /100 WBC
PLATELET # BLD: 210 THOU/MM3 (ref 130–400)
PMV BLD AUTO: 12.1 FL (ref 9.4–12.4)
RBC # BLD: 4.14 MILL/MM3 (ref 4.2–5.4)
SEG NEUTROPHILS: 86.4 %
SEGMENTED NEUTROPHILS ABSOLUTE COUNT: 10.6 THOU/MM3 (ref 1.8–7.7)
TOTAL PROTEIN: 5.8 G/DL (ref 6.1–8)
WBC # BLD: 12.3 THOU/MM3 (ref 4.8–10.8)

## 2022-05-28 PROCEDURE — 2580000003 HC RX 258: Performed by: SURGERY

## 2022-05-28 PROCEDURE — 80076 HEPATIC FUNCTION PANEL: CPT

## 2022-05-28 PROCEDURE — 6360000002 HC RX W HCPCS: Performed by: NURSE PRACTITIONER

## 2022-05-28 PROCEDURE — 99024 POSTOP FOLLOW-UP VISIT: CPT | Performed by: SURGERY

## 2022-05-28 PROCEDURE — 85025 COMPLETE CBC W/AUTO DIFF WBC: CPT

## 2022-05-28 PROCEDURE — 6360000002 HC RX W HCPCS: Performed by: SURGERY

## 2022-05-28 PROCEDURE — 36415 COLL VENOUS BLD VENIPUNCTURE: CPT

## 2022-05-28 PROCEDURE — 1200000000 HC SEMI PRIVATE

## 2022-05-28 PROCEDURE — 6370000000 HC RX 637 (ALT 250 FOR IP): Performed by: SURGERY

## 2022-05-28 RX ADMIN — PIPERACILLIN AND TAZOBACTAM 3375 MG: 3; .375 INJECTION, POWDER, LYOPHILIZED, FOR SOLUTION INTRAVENOUS at 21:55

## 2022-05-28 RX ADMIN — SODIUM CHLORIDE: 9 INJECTION, SOLUTION INTRAVENOUS at 07:43

## 2022-05-28 RX ADMIN — HYDROMORPHONE HYDROCHLORIDE 1 MG: 1 INJECTION, SOLUTION INTRAMUSCULAR; INTRAVENOUS; SUBCUTANEOUS at 20:48

## 2022-05-28 RX ADMIN — ENOXAPARIN SODIUM 40 MG: 100 INJECTION SUBCUTANEOUS at 06:24

## 2022-05-28 RX ADMIN — HYDROMORPHONE HYDROCHLORIDE 0.5 MG: 1 INJECTION, SOLUTION INTRAMUSCULAR; INTRAVENOUS; SUBCUTANEOUS at 17:45

## 2022-05-28 RX ADMIN — PIPERACILLIN AND TAZOBACTAM 3375 MG: 3; .375 INJECTION, POWDER, LYOPHILIZED, FOR SOLUTION INTRAVENOUS at 06:25

## 2022-05-28 RX ADMIN — ONDANSETRON 4 MG: 2 INJECTION INTRAMUSCULAR; INTRAVENOUS at 03:25

## 2022-05-28 RX ADMIN — HYDROMORPHONE HYDROCHLORIDE 1 MG: 1 INJECTION, SOLUTION INTRAMUSCULAR; INTRAVENOUS; SUBCUTANEOUS at 03:25

## 2022-05-28 RX ADMIN — HYDROMORPHONE HYDROCHLORIDE 0.5 MG: 1 INJECTION, SOLUTION INTRAMUSCULAR; INTRAVENOUS; SUBCUTANEOUS at 10:36

## 2022-05-28 RX ADMIN — ONDANSETRON 4 MG: 2 INJECTION INTRAMUSCULAR; INTRAVENOUS at 17:41

## 2022-05-28 RX ADMIN — ONDANSETRON 4 MG: 2 INJECTION INTRAMUSCULAR; INTRAVENOUS at 10:32

## 2022-05-28 RX ADMIN — HYDROMORPHONE HYDROCHLORIDE 0.5 MG: 1 INJECTION, SOLUTION INTRAMUSCULAR; INTRAVENOUS; SUBCUTANEOUS at 23:57

## 2022-05-28 RX ADMIN — SODIUM CHLORIDE: 9 INJECTION, SOLUTION INTRAVENOUS at 21:55

## 2022-05-28 RX ADMIN — ESTRADIOL 1 MG: 1 TABLET ORAL at 07:44

## 2022-05-28 RX ADMIN — HYDROMORPHONE HYDROCHLORIDE 1 MG: 1 INJECTION, SOLUTION INTRAMUSCULAR; INTRAVENOUS; SUBCUTANEOUS at 06:24

## 2022-05-28 RX ADMIN — ONDANSETRON 4 MG: 2 INJECTION INTRAMUSCULAR; INTRAVENOUS at 23:57

## 2022-05-28 RX ADMIN — SODIUM CHLORIDE: 9 INJECTION, SOLUTION INTRAVENOUS at 15:29

## 2022-05-28 RX ADMIN — FAMOTIDINE 20 MG: 20 TABLET ORAL at 07:44

## 2022-05-28 RX ADMIN — PIPERACILLIN AND TAZOBACTAM 3375 MG: 3; .375 INJECTION, POWDER, LYOPHILIZED, FOR SOLUTION INTRAVENOUS at 14:01

## 2022-05-28 ASSESSMENT — PAIN DESCRIPTION - LOCATION
LOCATION: ABDOMEN

## 2022-05-28 ASSESSMENT — PAIN DESCRIPTION - ONSET
ONSET: ON-GOING
ONSET: ON-GOING

## 2022-05-28 ASSESSMENT — PAIN SCALES - GENERAL
PAINLEVEL_OUTOF10: 9
PAINLEVEL_OUTOF10: 9
PAINLEVEL_OUTOF10: 6
PAINLEVEL_OUTOF10: 4
PAINLEVEL_OUTOF10: 7
PAINLEVEL_OUTOF10: 4
PAINLEVEL_OUTOF10: 3
PAINLEVEL_OUTOF10: 7
PAINLEVEL_OUTOF10: 4

## 2022-05-28 ASSESSMENT — PAIN DESCRIPTION - DESCRIPTORS
DESCRIPTORS: ACHING
DESCRIPTORS: ACHING;CRAMPING;PRESSURE
DESCRIPTORS: ACHING
DESCRIPTORS: ACHING

## 2022-05-28 ASSESSMENT — PAIN DESCRIPTION - FREQUENCY
FREQUENCY: CONTINUOUS
FREQUENCY: CONTINUOUS

## 2022-05-28 ASSESSMENT — PAIN DESCRIPTION - ORIENTATION
ORIENTATION: MID;LOWER
ORIENTATION: LOWER;MID
ORIENTATION: LEFT;RIGHT;LOWER;MID
ORIENTATION: LOWER;MID

## 2022-05-28 ASSESSMENT — PAIN DESCRIPTION - PAIN TYPE
TYPE: SURGICAL PAIN
TYPE: SURGICAL PAIN

## 2022-05-28 NOTE — PLAN OF CARE
Problem: Nutrition Deficit:  Goal: Optimize nutritional status  Outcome: Not Progressing  Flowsheets (Taken 5/27/2022 1721)  Nutrient intake appropriate for improving, restoring, or maintaining nutritional needs: Assess nutritional status and recommend course of action     Problem: Pain  Goal: Verbalizes/displays adequate comfort level or baseline comfort level  Flowsheets (Taken 5/27/2022 1721)  Verbalizes/displays adequate comfort level or baseline comfort level:   Encourage patient to monitor pain and request assistance   Assess pain using appropriate pain scale   Administer analgesics based on type and severity of pain and evaluate response   Implement non-pharmacological measures as appropriate and evaluate response     Problem: Discharge Planning  Goal: Discharge to home or other facility with appropriate resources  5/28/2022 1051 by Keyona Salguero RN  Outcome: Progressing  Flowsheets (Taken 5/28/2022 0755)  Discharge to home or other facility with appropriate resources: Identify barriers to discharge with patient and caregiver  5/28/2022 1042 by Keyona Salguero RN  Outcome: Not Progressing  Flowsheets (Taken 5/28/2022 0755)  Discharge to home or other facility with appropriate resources: Identify barriers to discharge with patient and caregiver     Problem: Safety - Adult  Goal: Free from fall injury  Outcome: Progressing   Care plan reviewed with patient and spouse. Patient and spouse verbalize understanding of the plan of care and contribute to goal setting.

## 2022-05-28 NOTE — PROGRESS NOTES
[address] [place] [address]   Dr. Linh Gracia MD  Daily Progress Note  Pt Name: Maryjane Meza  Medical Record Number: [id number]  Date of Birth 1965   Today's Date: 5/28/2022    POD# 1    CHIEF COMPLAINT GB/Adhesions    SUBJECTIVE  Patient feels OK some nausea    OBJECTIVE  CURRENT VITALS BP (!) 104/58   Pulse 72   Temp 98.1 °F (36.7 °C) (Oral)   Resp 16   SpO2 96%   LUNGS: Lungs clear   ABDOMEN: soft  WOUNDS: dressing dry  24 HR INTAKE/OUTPUT :   Intake/Output Summary (Last 24 hours) at 5/28/2022 1002  Last data filed at 5/28/2022 0323  Gross per 24 hour   Intake    Output 1850 ml   Net -1850 ml     DRAIN/TUBE OUTPUT :      LABS  CBC :   Lab Results   Component Value Date    WBC 12.3 05/28/2022    HGB 12.7 05/28/2022    HCT 37.8 05/28/2022     05/28/2022     BMP: No results found for: NA, K, CL, CO2, BUN, CREATININE, MG, PHOS    ASSESSMENT  1. Stable BR nl       PLAN  1.  Alice Mayberry MD  Electronically signed 5/28/2022 at 10:02 AM

## 2022-05-28 NOTE — FLOWSHEET NOTE
05/27/22 2118   Treatment Team Notification   Reason for Communication Patient/Family request   Team Member Name [facility]   Treatment Team Role Other (Comment)  (nurse practitioner)   Method of Communication Secure Message   Response Waiting for response   Notification Time 2119   Pt requested stranger pain medication and asked if she could eat ice chips.

## 2022-05-29 LAB
ALBUMIN SERPL-MCNC: 3.7 G/DL (ref 3.5–5.1)
ALP BLD-CCNC: 54 U/L (ref 38–126)
ALT SERPL-CCNC: 50 U/L (ref 11–66)
AST SERPL-CCNC: 37 U/L (ref 5–40)
BILIRUB SERPL-MCNC: 0.8 MG/DL (ref 0.3–1.2)
BILIRUBIN DIRECT: 0.3 MG/DL (ref 0–0.3)
ERYTHROCYTE [DISTWIDTH] IN BLOOD BY AUTOMATED COUNT: 13 % (ref 11.5–14.5)
ERYTHROCYTE [DISTWIDTH] IN BLOOD BY AUTOMATED COUNT: 42 FL (ref 35–45)
HCT VFR BLD CALC: 34.6 % (ref 37–47)
HEMOGLOBIN: 11.5 GM/DL (ref 12–16)
MCH RBC QN AUTO: 30 PG (ref 26–33)
MCHC RBC AUTO-ENTMCNC: 33.2 GM/DL (ref 32.2–35.5)
MCV RBC AUTO: 90.3 FL (ref 81–99)
PLATELET # BLD: 190 THOU/MM3 (ref 130–400)
PMV BLD AUTO: 11.7 FL (ref 9.4–12.4)
RBC # BLD: 3.83 MILL/MM3 (ref 4.2–5.4)
TOTAL PROTEIN: 5.7 G/DL (ref 6.1–8)
WBC # BLD: 10.4 THOU/MM3 (ref 4.8–10.8)

## 2022-05-29 PROCEDURE — 6360000002 HC RX W HCPCS: Performed by: SURGERY

## 2022-05-29 PROCEDURE — 2500000003 HC RX 250 WO HCPCS: Performed by: SURGERY

## 2022-05-29 PROCEDURE — 6360000002 HC RX W HCPCS: Performed by: NURSE PRACTITIONER

## 2022-05-29 PROCEDURE — 36415 COLL VENOUS BLD VENIPUNCTURE: CPT

## 2022-05-29 PROCEDURE — 2580000003 HC RX 258: Performed by: SURGERY

## 2022-05-29 PROCEDURE — 6370000000 HC RX 637 (ALT 250 FOR IP): Performed by: SURGERY

## 2022-05-29 PROCEDURE — 1200000000 HC SEMI PRIVATE

## 2022-05-29 PROCEDURE — 85027 COMPLETE CBC AUTOMATED: CPT

## 2022-05-29 PROCEDURE — 99024 POSTOP FOLLOW-UP VISIT: CPT | Performed by: SURGERY

## 2022-05-29 PROCEDURE — 80076 HEPATIC FUNCTION PANEL: CPT

## 2022-05-29 RX ORDER — TRAMADOL HYDROCHLORIDE 50 MG/1
50 TABLET ORAL EVERY 6 HOURS PRN
Status: DISCONTINUED | OUTPATIENT
Start: 2022-05-29 | End: 2022-06-01 | Stop reason: HOSPADM

## 2022-05-29 RX ORDER — TRAMADOL HYDROCHLORIDE 50 MG/1
100 TABLET ORAL EVERY 6 HOURS PRN
Status: DISCONTINUED | OUTPATIENT
Start: 2022-05-29 | End: 2022-06-01 | Stop reason: HOSPADM

## 2022-05-29 RX ADMIN — PIPERACILLIN AND TAZOBACTAM 3375 MG: 3; .375 INJECTION, POWDER, LYOPHILIZED, FOR SOLUTION INTRAVENOUS at 14:07

## 2022-05-29 RX ADMIN — PIPERACILLIN AND TAZOBACTAM 3375 MG: 3; .375 INJECTION, POWDER, LYOPHILIZED, FOR SOLUTION INTRAVENOUS at 06:00

## 2022-05-29 RX ADMIN — SODIUM CHLORIDE, PRESERVATIVE FREE 10 ML: 5 INJECTION INTRAVENOUS at 08:54

## 2022-05-29 RX ADMIN — ONDANSETRON 4 MG: 2 INJECTION INTRAMUSCULAR; INTRAVENOUS at 20:54

## 2022-05-29 RX ADMIN — SODIUM CHLORIDE: 9 INJECTION, SOLUTION INTRAVENOUS at 05:58

## 2022-05-29 RX ADMIN — PIPERACILLIN AND TAZOBACTAM 3375 MG: 3; .375 INJECTION, POWDER, LYOPHILIZED, FOR SOLUTION INTRAVENOUS at 21:50

## 2022-05-29 RX ADMIN — FAMOTIDINE 20 MG: 20 TABLET ORAL at 08:53

## 2022-05-29 RX ADMIN — ESTRADIOL 1 MG: 1 TABLET ORAL at 08:53

## 2022-05-29 RX ADMIN — HYDROMORPHONE HYDROCHLORIDE 0.5 MG: 1 INJECTION, SOLUTION INTRAMUSCULAR; INTRAVENOUS; SUBCUTANEOUS at 20:54

## 2022-05-29 RX ADMIN — SODIUM CHLORIDE: 9 INJECTION, SOLUTION INTRAVENOUS at 14:02

## 2022-05-29 RX ADMIN — SODIUM CHLORIDE: 9 INJECTION, SOLUTION INTRAVENOUS at 21:48

## 2022-05-29 RX ADMIN — FAMOTIDINE 20 MG: 10 INJECTION, SOLUTION INTRAVENOUS at 19:48

## 2022-05-29 RX ADMIN — ONDANSETRON 4 MG: 2 INJECTION INTRAMUSCULAR; INTRAVENOUS at 06:04

## 2022-05-29 RX ADMIN — ONDANSETRON 4 MG: 2 INJECTION INTRAMUSCULAR; INTRAVENOUS at 14:06

## 2022-05-29 ASSESSMENT — PAIN SCALES - GENERAL
PAINLEVEL_OUTOF10: 2
PAINLEVEL_OUTOF10: 4
PAINLEVEL_OUTOF10: 4
PAINLEVEL_OUTOF10: 3
PAINLEVEL_OUTOF10: 3

## 2022-05-29 ASSESSMENT — PAIN DESCRIPTION - ONSET: ONSET: ON-GOING

## 2022-05-29 ASSESSMENT — PAIN - FUNCTIONAL ASSESSMENT: PAIN_FUNCTIONAL_ASSESSMENT: ACTIVITIES ARE NOT PREVENTED

## 2022-05-29 ASSESSMENT — PAIN DESCRIPTION - ORIENTATION: ORIENTATION: LOWER;MID

## 2022-05-29 ASSESSMENT — PAIN DESCRIPTION - LOCATION: LOCATION: ABDOMEN

## 2022-05-29 ASSESSMENT — PAIN DESCRIPTION - DESCRIPTORS: DESCRIPTORS: ACHING

## 2022-05-29 ASSESSMENT — PAIN DESCRIPTION - FREQUENCY: FREQUENCY: CONTINUOUS

## 2022-05-29 ASSESSMENT — PAIN DESCRIPTION - PAIN TYPE: TYPE: SURGICAL PAIN

## 2022-05-29 NOTE — PROGRESS NOTES
Surg postop day #2 labs look good liver function tests are normal White count is normal patient still with some nausea mild abdominal bloating but tolerating clear liquids wound is little bit red but on Zosyn at this time continue current therapy

## 2022-05-29 NOTE — PLAN OF CARE
Problem: Discharge Planning  Goal: Discharge to home or other facility with appropriate resources  5/29/2022 0929 by José Antonio Reaves RN  Outcome: Progressing  Flowsheets (Taken 5/28/2022 0755 by Marguerite Green RN)  Discharge to home or other facility with appropriate resources: Identify barriers to discharge with patient and caregiver     Problem: Pain  Goal: Verbalizes/displays adequate comfort level or baseline comfort level  5/29/2022 0929 by José Antonio Reaves RN  Outcome: Progressing  Flowsheets (Taken 5/27/2022 1721 by Marguerite Green RN)  Verbalizes/displays adequate comfort level or baseline comfort level:   Encourage patient to monitor pain and request assistance   Assess pain using appropriate pain scale   Administer analgesics based on type and severity of pain and evaluate response   Implement non-pharmacological measures as appropriate and evaluate response     Problem: Safety - Adult  Goal: Free from fall injury  5/29/2022 0929 by José Antonio Reaves RN  Outcome: Progressing  Flowsheets (Taken 5/29/2022 0917)  Free From Fall Injury: Instruct family/caregiver on patient safety     Problem: Nutrition Deficit:  Goal: Optimize nutritional status  5/29/2022 0929 by José Antonio Reaves RN  Outcome: Progressing  Flowsheets (Taken 5/27/2022 1721 by Marguerite Green RN)  Nutrient intake appropriate for improving, restoring, or maintaining nutritional needs: Assess nutritional status and recommend course of action     Problem: ABCDS Injury Assessment  Goal: Absence of physical injury  5/29/2022 0929 by José Antonio Reaves RN  Outcome: Progressing  Flowsheets (Taken 5/29/2022 0927)  Absence of Physical Injury: Implement safety measures based on patient assessment  5/29/2022 0927 by José Antonio Reaves RN  Outcome: Progressing  Flowsheets (Taken 5/29/2022 5025)  Absence of Physical Injury: Implement safety measures based on patient assessment

## 2022-05-29 NOTE — PLAN OF CARE
Problem: Discharge Planning  Goal: Discharge to home or other facility with appropriate resources  Outcome: Progressing  Flowsheets (Taken 5/28/2022 0755 by Ac Dash, YENY)  Discharge to home or other facility with appropriate resources: Identify barriers to discharge with patient and caregiver     Problem: Pain  Goal: Verbalizes/displays adequate comfort level or baseline comfort level  Outcome: Progressing  Flowsheets (Taken 5/27/2022 1721 by Ac Dash, YENY)  Verbalizes/displays adequate comfort level or baseline comfort level:   Encourage patient to monitor pain and request assistance   Assess pain using appropriate pain scale   Administer analgesics based on type and severity of pain and evaluate response   Implement non-pharmacological measures as appropriate and evaluate response     Problem: Safety - Adult  Goal: Free from fall injury  Outcome: Progressing  Flowsheets (Taken 5/29/2022 0917)  Free From Fall Injury: Instruct family/caregiver on patient safety     Problem: Nutrition Deficit:  Goal: Optimize nutritional status  Outcome: Progressing  Flowsheets (Taken 5/27/2022 1721 by Ac Dash, RN)  Nutrient intake appropriate for improving, restoring, or maintaining nutritional needs: Assess nutritional status and recommend course of action     Problem: ABCDS Injury Assessment  Goal: Absence of physical injury  Outcome: Progressing  Flowsheets (Taken 5/29/2022 0927)  Absence of Physical Injury: Implement safety measures based on patient assessment

## 2022-05-30 PROCEDURE — 1200000000 HC SEMI PRIVATE

## 2022-05-30 PROCEDURE — 6370000000 HC RX 637 (ALT 250 FOR IP): Performed by: SURGERY

## 2022-05-30 PROCEDURE — 6360000002 HC RX W HCPCS: Performed by: SURGERY

## 2022-05-30 PROCEDURE — 2580000003 HC RX 258: Performed by: SURGERY

## 2022-05-30 PROCEDURE — 6370000000 HC RX 637 (ALT 250 FOR IP): Performed by: NURSE PRACTITIONER

## 2022-05-30 PROCEDURE — 99024 POSTOP FOLLOW-UP VISIT: CPT | Performed by: SURGERY

## 2022-05-30 PROCEDURE — 2500000003 HC RX 250 WO HCPCS: Performed by: SURGERY

## 2022-05-30 RX ORDER — PROMETHAZINE HYDROCHLORIDE 25 MG/1
25 TABLET ORAL EVERY 6 HOURS PRN
Status: DISCONTINUED | OUTPATIENT
Start: 2022-05-30 | End: 2022-06-01 | Stop reason: HOSPADM

## 2022-05-30 RX ORDER — METOCLOPRAMIDE HYDROCHLORIDE 5 MG/ML
10 INJECTION INTRAMUSCULAR; INTRAVENOUS EVERY 8 HOURS
Status: DISCONTINUED | OUTPATIENT
Start: 2022-05-30 | End: 2022-05-31

## 2022-05-30 RX ORDER — METOCLOPRAMIDE HYDROCHLORIDE 5 MG/ML
10 INJECTION INTRAMUSCULAR; INTRAVENOUS EVERY 8 HOURS
Status: DISCONTINUED | OUTPATIENT
Start: 2022-05-30 | End: 2022-05-30 | Stop reason: SDUPTHER

## 2022-05-30 RX ORDER — ESTRADIOL 1 MG/1
2 TABLET ORAL DAILY
Status: DISCONTINUED | OUTPATIENT
Start: 2022-05-31 | End: 2022-06-01 | Stop reason: HOSPADM

## 2022-05-30 RX ADMIN — PIPERACILLIN AND TAZOBACTAM 3375 MG: 3; .375 INJECTION, POWDER, LYOPHILIZED, FOR SOLUTION INTRAVENOUS at 21:51

## 2022-05-30 RX ADMIN — SODIUM CHLORIDE: 9 INJECTION, SOLUTION INTRAVENOUS at 21:50

## 2022-05-30 RX ADMIN — ONDANSETRON 4 MG: 2 INJECTION INTRAMUSCULAR; INTRAVENOUS at 09:41

## 2022-05-30 RX ADMIN — FAMOTIDINE 20 MG: 10 INJECTION, SOLUTION INTRAVENOUS at 09:27

## 2022-05-30 RX ADMIN — METOCLOPRAMIDE HYDROCHLORIDE 10 MG: 5 INJECTION INTRAMUSCULAR; INTRAVENOUS at 11:24

## 2022-05-30 RX ADMIN — PROMETHAZINE HYDROCHLORIDE 25 MG: 25 TABLET ORAL at 11:24

## 2022-05-30 RX ADMIN — TRAMADOL HYDROCHLORIDE 50 MG: 50 TABLET, COATED ORAL at 23:42

## 2022-05-30 RX ADMIN — SODIUM CHLORIDE, PRESERVATIVE FREE 10 ML: 5 INJECTION INTRAVENOUS at 09:27

## 2022-05-30 RX ADMIN — PIPERACILLIN AND TAZOBACTAM 3375 MG: 3; .375 INJECTION, POWDER, LYOPHILIZED, FOR SOLUTION INTRAVENOUS at 05:55

## 2022-05-30 RX ADMIN — PIPERACILLIN AND TAZOBACTAM 3375 MG: 3; .375 INJECTION, POWDER, LYOPHILIZED, FOR SOLUTION INTRAVENOUS at 14:33

## 2022-05-30 RX ADMIN — ONDANSETRON 4 MG: 2 INJECTION INTRAMUSCULAR; INTRAVENOUS at 03:24

## 2022-05-30 RX ADMIN — SODIUM CHLORIDE: 9 INJECTION, SOLUTION INTRAVENOUS at 05:55

## 2022-05-30 RX ADMIN — SODIUM CHLORIDE: 9 INJECTION, SOLUTION INTRAVENOUS at 14:06

## 2022-05-30 RX ADMIN — FAMOTIDINE 20 MG: 20 TABLET ORAL at 19:46

## 2022-05-30 ASSESSMENT — PAIN DESCRIPTION - ORIENTATION
ORIENTATION: RIGHT;MID

## 2022-05-30 ASSESSMENT — PAIN DESCRIPTION - LOCATION
LOCATION: ABDOMEN;BACK
LOCATION: ABDOMEN
LOCATION: ABDOMEN;BACK

## 2022-05-30 ASSESSMENT — PAIN SCALES - GENERAL
PAINLEVEL_OUTOF10: 3
PAINLEVEL_OUTOF10: 4
PAINLEVEL_OUTOF10: 3
PAINLEVEL_OUTOF10: 3

## 2022-05-30 ASSESSMENT — PAIN DESCRIPTION - ONSET: ONSET: ON-GOING

## 2022-05-30 ASSESSMENT — PAIN DESCRIPTION - PAIN TYPE
TYPE: SURGICAL PAIN

## 2022-05-30 ASSESSMENT — PAIN DESCRIPTION - DESCRIPTORS
DESCRIPTORS: SORE

## 2022-05-30 ASSESSMENT — PAIN - FUNCTIONAL ASSESSMENT
PAIN_FUNCTIONAL_ASSESSMENT: ACTIVITIES ARE NOT PREVENTED

## 2022-05-30 ASSESSMENT — PAIN DESCRIPTION - FREQUENCY: FREQUENCY: CONTINUOUS

## 2022-05-30 NOTE — FLOWSHEET NOTE
05/29/22 2244   Treatment Team Notification   Reason for Communication Patient/Family request   Team Member Name [facility] Team Role Other (Comment)  (NP)   Method of Communication Secure Message   Response Waiting for response   Notification Time 2244   Pt in [id number] stated that her pain is much better controlled and would like to try something lower strength. She did state she didnt want to take tylenol, are we able to try toradol or someting similar? Thanks.

## 2022-05-30 NOTE — PLAN OF CARE
Problem: Discharge Planning  Goal: Discharge to home or other facility with appropriate resources  Outcome: Progressing  Flowsheets (Taken 5/30/2022 0914 by Afua Hancock)  Discharge to home or other facility with appropriate resources:   Identify barriers to discharge with patient and caregiver   Arrange for needed discharge resources and transportation as appropriate   Identify discharge learning needs (meds, wound care, etc)   Refer to discharge planning if patient needs post-hospital services based on physician order or complex needs related to functional status, cognitive ability or social support system     Problem: Pain  Goal: Verbalizes/displays adequate comfort level or baseline comfort level  Outcome: Progressing  Flowsheets (Taken 5/27/2022 1721 by Razia Sharma RN)  Verbalizes/displays adequate comfort level or baseline comfort level:   Encourage patient to monitor pain and request assistance   Assess pain using appropriate pain scale   Administer analgesics based on type and severity of pain and evaluate response   Implement non-pharmacological measures as appropriate and evaluate response     Problem: Safety - Adult  Goal: Free from fall injury  Outcome: Progressing  Flowsheets (Taken 5/29/2022 0917 by Татьяна Richardson RN)  Free From Fall Injury: Instruct family/caregiver on patient safety     Problem: Nutrition Deficit:  Goal: Optimize nutritional status  Outcome: Progressing  Flowsheets (Taken 5/27/2022 1721 by Razia Sharma RN)  Nutrient intake appropriate for improving, restoring, or maintaining nutritional needs: Assess nutritional status and recommend course of action     Problem: ABCDS Injury Assessment  Goal: Absence of physical injury  Outcome: Progressing  Flowsheets (Taken 5/29/2022 0927 by Татьяна Richardson RN)  Absence of Physical Injury: Implement safety measures based on patient assessment   Care plan reviewed with patient and spouse.   Patient and spouse verbalize understanding of the plan of care and contribute to goal setting.

## 2022-05-30 NOTE — PROGRESS NOTES
Pt encouraged to get out of bed and ambulate. Pt refuses. States she is 'too groggy and doesn't want to fall.' Pt has been educated on the importance of ambulating and the prevention of DVT. Pt was educated on what a DVT is, and how life threatening it is. Pt instructs she would ambulate with assistance.

## 2022-05-31 LAB
ERYTHROCYTE [DISTWIDTH] IN BLOOD BY AUTOMATED COUNT: 12.7 % (ref 11.5–14.5)
ERYTHROCYTE [DISTWIDTH] IN BLOOD BY AUTOMATED COUNT: 41.1 FL (ref 35–45)
HCT VFR BLD CALC: 31.4 % (ref 37–47)
HEMOGLOBIN: 10.9 GM/DL (ref 12–16)
MCH RBC QN AUTO: 31.3 PG (ref 26–33)
MCHC RBC AUTO-ENTMCNC: 34.7 GM/DL (ref 32.2–35.5)
MCV RBC AUTO: 90.2 FL (ref 81–99)
PLATELET # BLD: 216 THOU/MM3 (ref 130–400)
PMV BLD AUTO: 11 FL (ref 9.4–12.4)
RBC # BLD: 3.48 MILL/MM3 (ref 4.2–5.4)
WBC # BLD: 6.5 THOU/MM3 (ref 4.8–10.8)

## 2022-05-31 PROCEDURE — 99024 POSTOP FOLLOW-UP VISIT: CPT | Performed by: SURGERY

## 2022-05-31 PROCEDURE — 6370000000 HC RX 637 (ALT 250 FOR IP): Performed by: SURGERY

## 2022-05-31 PROCEDURE — 2580000003 HC RX 258: Performed by: SURGERY

## 2022-05-31 PROCEDURE — 1200000000 HC SEMI PRIVATE

## 2022-05-31 PROCEDURE — 36415 COLL VENOUS BLD VENIPUNCTURE: CPT

## 2022-05-31 PROCEDURE — 6360000002 HC RX W HCPCS: Performed by: SURGERY

## 2022-05-31 PROCEDURE — 85027 COMPLETE CBC AUTOMATED: CPT

## 2022-05-31 PROCEDURE — 99024 POSTOP FOLLOW-UP VISIT: CPT | Performed by: NURSE PRACTITIONER

## 2022-05-31 PROCEDURE — APPSS30 APP SPLIT SHARED TIME 16-30 MINUTES: Performed by: NURSE PRACTITIONER

## 2022-05-31 RX ADMIN — PIPERACILLIN AND TAZOBACTAM 3375 MG: 3; .375 INJECTION, POWDER, LYOPHILIZED, FOR SOLUTION INTRAVENOUS at 14:21

## 2022-05-31 RX ADMIN — FAMOTIDINE 20 MG: 20 TABLET ORAL at 19:19

## 2022-05-31 RX ADMIN — SODIUM CHLORIDE, PRESERVATIVE FREE 10 ML: 5 INJECTION INTRAVENOUS at 19:22

## 2022-05-31 RX ADMIN — PIPERACILLIN AND TAZOBACTAM 3375 MG: 3; .375 INJECTION, POWDER, LYOPHILIZED, FOR SOLUTION INTRAVENOUS at 21:58

## 2022-05-31 RX ADMIN — ESTRADIOL 2 MG: 1 TABLET ORAL at 08:59

## 2022-05-31 RX ADMIN — FAMOTIDINE 20 MG: 20 TABLET ORAL at 08:59

## 2022-05-31 RX ADMIN — SODIUM CHLORIDE: 9 INJECTION, SOLUTION INTRAVENOUS at 05:34

## 2022-05-31 RX ADMIN — PIPERACILLIN AND TAZOBACTAM 3375 MG: 3; .375 INJECTION, POWDER, LYOPHILIZED, FOR SOLUTION INTRAVENOUS at 05:33

## 2022-05-31 ASSESSMENT — PAIN SCALES - GENERAL
PAINLEVEL_OUTOF10: 2

## 2022-05-31 ASSESSMENT — PAIN DESCRIPTION - LOCATION
LOCATION: ABDOMEN
LOCATION: ABDOMEN

## 2022-05-31 ASSESSMENT — PAIN DESCRIPTION - ORIENTATION
ORIENTATION: MID
ORIENTATION: MID

## 2022-05-31 ASSESSMENT — PAIN - FUNCTIONAL ASSESSMENT
PAIN_FUNCTIONAL_ASSESSMENT: ACTIVITIES ARE NOT PREVENTED
PAIN_FUNCTIONAL_ASSESSMENT: ACTIVITIES ARE NOT PREVENTED

## 2022-05-31 ASSESSMENT — PAIN DESCRIPTION - ONSET
ONSET: ON-GOING
ONSET: ON-GOING

## 2022-05-31 ASSESSMENT — PAIN DESCRIPTION - FREQUENCY
FREQUENCY: CONTINUOUS
FREQUENCY: CONTINUOUS

## 2022-05-31 ASSESSMENT — PAIN DESCRIPTION - DESCRIPTORS
DESCRIPTORS: ACHING
DESCRIPTORS: ACHING

## 2022-05-31 NOTE — PLAN OF CARE
Problem: Discharge Planning  Goal: Discharge to home or other facility with appropriate resources  5/30/2022 2121 by Rob Ruby RN  Outcome: Progressing  Flowsheets (Taken 5/30/2022 2121)  Discharge to home or other facility with appropriate resources:   Identify barriers to discharge with patient and caregiver   Arrange for needed discharge resources and transportation as appropriate   Identify discharge learning needs (meds, wound care, etc)   Refer to discharge planning if patient needs post-hospital services based on physician order or complex needs related to functional status, cognitive ability or social support system     Problem: Pain  Goal: Verbalizes/displays adequate comfort level or baseline comfort level  5/30/2022 2121 by Rob Ruby RN  Outcome: Progressing  Flowsheets (Taken 5/30/2022 2121)  Verbalizes/displays adequate comfort level or baseline comfort level:   Encourage patient to monitor pain and request assistance   Assess pain using appropriate pain scale   Administer analgesics based on type and severity of pain and evaluate response   Implement non-pharmacological measures as appropriate and evaluate response   Notify Licensed Independent Practitioner if interventions unsuccessful or patient reports new pain     Problem: Safety - Adult  Goal: Free from fall injury  5/30/2022 2121 by Rob Ruby RN  Outcome: Progressing  [facility] (Taken 5/30/2022 2011)  Free From Fall Injury: Instruct family/caregiver on patient safety     Problem: Nutrition Deficit:  Goal: Optimize nutritional status  5/30/2022 2121 by Rob Ruby RN  Outcome: Progressing  Flowsheets (Taken 5/30/2022 2121)  Nutrient intake appropriate for improving, restoring, or maintaining nutritional needs:   Assess nutritional status and recommend course of action   Monitor oral intake, labs, and treatment plans   Provide specific nutrition education to patient or family as appropriate     Problem: ABCDS Injury Assessment  Goal: Absence of physical injury  5/30/2022 2121 by Nilesh Goodwin RN  Outcome: Progressing  Flowsheets (Taken 5/30/2022 2121)  Absence of Physical Injury: Implement safety measures based on patient assessment   Care plan reviewed with patient. Patient verbalize understanding of the plan of care and contribute to goal setting.

## 2022-05-31 NOTE — ACP (ADVANCE CARE PLANNING)
Advance Care Planning     Advance Care Planning Inpatient Note  [facility] Department    Today's Date: 5/31/2022  Unit: [facility]    Received request from Other: Rounding. Upon review of chart and communication with care team, patient's decision making abilities are not in question. . Patient and Significant Other  was/were present in the room during visit. Goals of ACP Conversation:  Discuss advance care planning documents    Health Care Decision Makers:     No healthcare decision makers have been documented. Click here to complete [address] including selection of the Healthcare Decision Maker Relationship (ie \"Primary\")  Summary:  No Decision Maker named by patient at this time      Advance Care Planning Documents (Patient Wishes):  None     Assessment:  * encountered patient in room with her domestic partner/signifiocant other. Patient was quite positive and portrayed a positive demeanor. Patient related a complicated diagnosis and treatment of gall bladder issues,   and the discovery of an adhesion which was not discovered for some time and was causing difficulties. Patient remained positive through this story. Patient and significant other have children separately.  advised looking at establishing a POA.  explained the hierarchy rules [address] [place] [address] & [facility] and explained the HCPOA form including the POA and livingwil. Copy left with the couple who say they will work at this soon. Interventions:  Provided education on documents for clarity and greater understanding  Discussed and provided education on state decision maker hierarchy  Deferred conversation as patient not interested in completing an advance directive at this time    Care Preferences Communicated:   No    Outcomes/Plan:  Copy of HCPOA left with recommendation to complete.      Electronically signed by Chaplain Karin on 5/31/2022 at 7:26 PM

## 2022-05-31 NOTE — CARE COORDINATION
5/31/22, 7:16 AM EDT  DISCHARGE PLANNING EVALUATION:    Nayana Mathur       Admitted: 5/27/2022/ [address] [place] day: 4   Location: -[facility] Reason for admit: Bowel perforation ([facility] [address] [K63.1]   PMH:  has a past medical history of Anxiety, Depression, Dysphagia, Gallstones, Gastric ulcer, GERD (gastroesophageal reflux disease), Headache(784.0), Prolonged emergence from general anesthesia, and Shortness of breath. Procedure:   5/27 1. Laparoscopic cholecystectomy with cholangiogram.  2.  Laparoscopic lysis of adhesions with conversion to open. Mini-laparotomy, lysis of adhesions, segmental small bowel resection. Barriers to Discharge:  From PACU, pain and nausea control, abdominal binder when up, Lovenox, Pepcid, IV Reglan, pain and nausea control, IV Zosyn. PCP: Luisa Leyva DO  Readmission Risk Score: 4.6 ( )%  Patient's Healthcare Decision Maker: Legal Next of Kin    Patient Goals/Plan/Treatment Preferences: Met with Raisa Bermudez. She currently lives at home with her fiance. She is independent. Plan is to return at discharge. She denies need for DME and declines HH. PCP and insurance verified. Will follow for potential needs. Transportation/Food Security/Housekeeping Addressed:  No issues identified.

## 2022-05-31 NOTE — PROGRESS NOTES
[facility]  Post Operative Progress Note  Dr Marnie Anne    Pt Name: Vijay Bowser Record Number: [id number]  Date of Birth 1965   Today's Date: 5/31/2022    Hospital day # 4   POD # 4    Chief complaint: mild bloating, several loose stools, hungry     Subjective: Patient was stable overnight. Chart reviewed. Updated by nursing staff. States she is feeling much better and would like some food. Denies chest discomfort or dyspnea. No N/V; (+) belching, flatus and BM. Tolerating clears will advance to  ADULT DIET; Full Liquid diet. Pain controlled with analgesia. Up with assistance     Past, Family, Social History unchanged from admission. Diet:  ADULT DIET; Full Liquid    Medications:  Scheduled Meds:   estradiol  2 mg Oral Daily    metoclopramide  10 mg IntraVENous Q8H    sodium chloride flush  5-40 mL IntraVENous 2 times per day    enoxaparin  40 mg SubCUTAneous Q24H    famotidine  20 mg Oral BID    Or    famotidine (PEPCID) injection  20 mg IntraVENous BID    piperacillin-tazobactam (ZOSYN) 3375 mg in dextrose 5% IVPB extended infusion (mini-bag)  3,375 mg IntraVENous Q8H     Continuous Infusions:   sodium chloride 125 mL/hr at 05/31/22 0534    sodium chloride       PRN Meds:promethazine, traMADol **OR** traMADol, sodium chloride flush, sodium chloride, ondansetron **OR** ondansetron, HYDROmorphone **OR** HYDROmorphone    Objective:    CBC:   Recent Labs     05/29/22  0621 05/31/22  0603   WBC 10.4 6.5   HGB 11.5* 10.9*    216     BMP:  No results for input(s): NA, K, CL, CO2, BUN, CREATININE, GLUCOSE in the last 72 hours. Calcium:No results for input(s): CALCIUM in the last 72 hours. Ionized Calcium:No results for input(s): IONCA in the last 72 hours. Magnesium:No results for input(s): MG in the last 72 hours. Phosphorus:No results for input(s): PHOS in the last 72 hours. BNP:No results for input(s): BNP in the last 72 hours.   Glucose:No results for input(s): POCGLU in the last 72 hours. HgbA1C: No results for input(s): LABA1C in the last 72 hours. INR: No results for input(s): INR in the last 72 hours. Hepatic:   Recent Labs     05/29/22  0621   ALKPHOS 54   ALT 50   AST 37   PROT 5.7*   BILITOT 0.8   BILIDIR 0.3   LABALBU 3.7     Amylase and Lipase:No results for input(s): LACTA, AMYLASE in the last 72 hours. Lactic Acid: No results for input(s): LACTA in the last 72 hours. Troponin: No results for input(s): CKTOTAL, CKMB, TROPONINT in the last 72 hours. BNP: No results for input(s): BNP in the last 72 hours. Lipids: No results for input(s): CHOL, TRIG, HDL, LDL, LDLCALC in the last 72 hours. ABGs: No results found for: PH, PCO2, PO2, HCO3, O2SAT    Radiology reports as per the Radiologist  Radiology: Francine Bhatia 98    Result Date: 5/27/2022  Radiology exam is complete. No Radiologist dictation. Please follow up with ordering provider. Physical Exam:  Vitals: /77   Pulse 75   Temp 98 °F (36.7 °C) (Oral)   Resp 18   SpO2 96%   24 hour intake/output:    Intake/Output Summary (Last 24 hours) at 5/31/2022 1148  Last data filed at 5/31/2022 0327  Gross per 24 hour   Intake 48501.98 ml   Output    Net 12395.98 ml     Last 3 weights:   Wt Readings from Last 3 Encounters:   04/19/22 139 lb (63 kg)   04/01/22 139 lb 6.4 oz (63.2 kg)   02/15/22 139 lb 9.6 oz (63.3 kg)       General appearance - oriented to person, place, and time  HEENT: Normocephalic and Atraumatic  Chest - clear to auscultation, no wheezes, rales or rhonchi, symmetric air entry  Cardiovascular - normal rate and regular rhythm  Abdomen - tenderness noted as expected, softly distended, BS active  Surgical Incision: well approximated, no drainage no erythema   Neurological - Alert and oriented and Normal speech  Integumentary - Skin color, texture, turgor normal. No Rashes or lesions  Musculoskeletal -Full ROM times 4 extremities      DVT prophylaxis: [x] Lovenox [x] SCDs                                 [] SQ Heparin                                 [] Encourage ambulation           [] Already on Anticoagulation                 ASSESSMENT:  S/p Laparoscopic cholecystectomy with cholangiogram, Laparoscopic lysis of adhesions with conversion to open, Mini-laparotomy, lysis of adhesions, segmental small bowel resection. POD# 4  1. Post operative pain is minimal   2. Bowel function has returned   3. Surgical pathology pending        has a past medical history of Anxiety, Depression, Dysphagia, Gallstones, Gastric ulcer, GERD (gastroesophageal reflux disease), Headache(784.0), Prolonged emergence from general anesthesia, and Shortness of breath. PLAN:  1. Routine incisional care daily  2. Monitor labs, replace lytes per protocol  3. Diet full liquids   4. Iv hydration dc'd   5. DVT and GI Prophylaxis  6. Activity - ambulate, OOB to chair, C&DB,  IS  7. Analgesia and antiemetics as needed  8. Antibiotic Therapy   9. Discharge planning 1-2 days pending progress       Electronically signed by EULOGIO Garcia CNP on 5/31/2022 at 11:48 AM Patient seen and examined independently by me. Above discussed and I agree with CNP. Labs, cultures, and radiographs where available were reviewed. See orders for the updated patient care plan.     Dennis Vázquez MD MD, feels better abd soft BS +  + BM inc diet  5/31/2022   12:34 PM

## 2022-05-31 NOTE — PLAN OF CARE
Problem: Discharge Planning  Goal: Discharge to home or other facility with appropriate resources  Outcome: Progressing  Flowsheets (Taken 5/31/2022 0849)  Discharge to home or other facility with appropriate resources: Identify barriers to discharge with patient and caregiver     Problem: Pain  Goal: Verbalizes/displays adequate comfort level or baseline comfort level  Outcome: Progressing  Flowsheets (Taken 5/30/2022 2121 by Bertha Pierre, RN)  Verbalizes/displays adequate comfort level or baseline comfort level:   Encourage patient to monitor pain and request assistance   Assess pain using appropriate pain scale   Administer analgesics based on type and severity of pain and evaluate response   Implement non-pharmacological measures as appropriate and evaluate response   Notify Licensed Independent Practitioner if interventions unsuccessful or patient reports new pain     Problem: Safety - Adult  Goal: Free from fall injury  Outcome: Progressing  Flowsheets (Taken 5/30/2022 2011 by Bertha Pierre, RN)  Free From Fall Injury: Instruct family/caregiver on patient safety     Problem: Nutrition Deficit:  Goal: Optimize nutritional status  Outcome: Progressing  Flowsheets (Taken 5/30/2022 2121 by Bertha Pierre, RN)  Nutrient intake appropriate for improving, restoring, or maintaining nutritional needs:   Assess nutritional status and recommend course of action   Monitor oral intake, labs, and treatment plans   Provide specific nutrition education to patient or family as appropriate     Problem: ABCDS Injury Assessment  Goal: Absence of physical injury  Outcome: Progressing  Flowsheets (Taken 5/30/2022 2121 by Bertha Pierre, RN)  Absence of Physical Injury: Implement safety measures based on patient assessment   Care plan reviewed with patient and spouse. Patient and spouse verbalize understanding of the plan of care and contribute to goal setting.

## 2022-05-31 NOTE — FLOWSHEET NOTE
[facility]. [facility] PRO[facility]ESS [facility]      Patient: Terrie Lane  Room #: 4C-26/218-Q            YOB: 1965  Age: 59 y.o. Gender: female            Admit Date & Time: 5/27/2022  6:34 AM    Assessment:   Interventions:   Outcomes:     encountered patient in room with her domestic partner/signifiocant other. Patient was quite positive and portrayed a positive demeanor. Patient related a complicated diagnosis and treatment of gall bladder issues,   and the discovery of an adhesion which was not discovered for some time and was causing difficulties. Patient remained positive through this story. Patient and significant other have children separately.  advised looking at establishing a POA.  explained the hierarchy rules [address] [place] [address] & [facility] and explained the HCPOA form including the POA and l;ivingwil. Copy left we couple who say they will work at this soon. Plan:    1. Follow up with couple when they initiate an ACP conversation.     Electronically signed by Abbey Roberts on 5/31/2022 at 7:19 PM.  [facility] [place]  [phone]

## 2022-06-01 VITALS
TEMPERATURE: 97.6 F | HEART RATE: 69 BPM | RESPIRATION RATE: 20 BRPM | SYSTOLIC BLOOD PRESSURE: 130 MMHG | DIASTOLIC BLOOD PRESSURE: 82 MMHG | OXYGEN SATURATION: 96 %

## 2022-06-01 PROBLEM — Z90.49 S/P LAPAROSCOPIC CHOLECYSTECTOMY: Status: ACTIVE | Noted: 2022-06-01

## 2022-06-01 PROBLEM — K63.1 BOWEL PERFORATION (HCC): Status: RESOLVED | Noted: 2022-05-27 | Resolved: 2022-06-01

## 2022-06-01 PROBLEM — Z90.49 S/P SMALL BOWEL RESECTION: Status: ACTIVE | Noted: 2022-06-01

## 2022-06-01 PROCEDURE — 99239 HOSP IP/OBS DSCHRG MGMT >30: CPT | Performed by: NURSE PRACTITIONER

## 2022-06-01 PROCEDURE — 6370000000 HC RX 637 (ALT 250 FOR IP): Performed by: SURGERY

## 2022-06-01 PROCEDURE — 6360000002 HC RX W HCPCS: Performed by: SURGERY

## 2022-06-01 PROCEDURE — 2580000003 HC RX 258: Performed by: SURGERY

## 2022-06-01 PROCEDURE — APPSS30 APP SPLIT SHARED TIME 16-30 MINUTES: Performed by: NURSE PRACTITIONER

## 2022-06-01 RX ORDER — TRAMADOL HYDROCHLORIDE 50 MG/1
50 TABLET ORAL EVERY 6 HOURS PRN
Qty: 16 TABLET | Refills: 0 | Status: SHIPPED | OUTPATIENT
Start: 2022-06-01 | End: 2022-06-08

## 2022-06-01 RX ADMIN — PIPERACILLIN AND TAZOBACTAM 3375 MG: 3; .375 INJECTION, POWDER, LYOPHILIZED, FOR SOLUTION INTRAVENOUS at 06:20

## 2022-06-01 RX ADMIN — FAMOTIDINE 20 MG: 20 TABLET ORAL at 08:36

## 2022-06-01 RX ADMIN — ESTRADIOL 2 MG: 1 TABLET ORAL at 08:36

## 2022-06-01 RX ADMIN — SODIUM CHLORIDE, PRESERVATIVE FREE 10 ML: 5 INJECTION INTRAVENOUS at 08:41

## 2022-06-01 ASSESSMENT — PAIN SCALES - GENERAL: PAINLEVEL_OUTOF10: 2

## 2022-06-01 NOTE — CARE COORDINATION
6/1/22, 12:32 PM EDT    DISCHARGE ON [address]ING [address] day: 5  Location: -59/059-A Reason for admit: Bowel perforation ([facility] [address] [K63.1]   Procedure:   5/27 1. Laparoscopic cholecystectomy with cholangiogram.  2.  Laparoscopic lysis of adhesions with conversion to open. Mini-laparotomy, lysis of adhesions, segmental small bowel resection  Barriers to Discharge: Lovenox, Pepcid, IV Reglan, pain and nausea control, IV Zosyn. PCP: Giana Rosales DO  Readmission Risk Score: 3 ( )%  Patient Goals/Plan/Treatment Preferences: Plans home with sig other. Denies needs. 6/1/22, 2:27 PM EDT    Patient goals/plan/ treatment preferences discussed by  and . Patient goals/plan/ treatment preferences reviewed with patient/ family. Patient/ family verbalize understanding of discharge plan and are in agreement with goal/plan/treatment preferences. Understanding was demonstrated using the teach back method. AVS provided by RN at time of discharge, which includes all necessary medical information pertaining to the patients current course of illness, treatment, post-discharge goals of care, and treatment preferences. Services At/After Discharge: None   Pt to be discharged to home with sig other. Denies needs or services.

## 2022-06-01 NOTE — DISCHARGE SUMMARY
Discharge Summary     Patient Identification:  Claire Oppenheim  : 1965  MRN: [id number]   Account: [de-identified]     Admit date: 2022  Discharge date: 2022   Attending provider: Frances Grover MD        Primary care provider: Arnulfo Stubbs DO     Discharge Diagnoses:   Principal Problem (Resolved): Bowel perforation (HCC)  Active Problems:    S/P laparoscopic cholecystectomy    S/P small bowel resection  Resolved Problems:    Calculus of gallbladder with chronic cholecystitis without obstruction       Hospital Course:   Claire Oppenheim is a 59 y.o. female admitted to [address] [place] [address] on 2022 for left upper quadrant pain with possible adhesions and cholelithiasis. she was taken to the operative suite per Melvin Perez MD and the planned procedure was performed as noted above. She was admitted to [address] [place] [address] for postoperative care and was initially managed with bowel rest, analgesics for pain control, IV fluid hydration, GI and DVT prophylaxis and IV antibiotics. Over the hospital stay she slowly improved in ability to tolerate increasing levels of activity and to take po fluids, solid foods with evidence of returning bowel function, and spontaneously voiding. At the time of discharge she was able to tolerate pain with oral analgesic and was medically stable. FINAL DIAGNOSIS:   A: Gallbladder, cholecystectomy:     Chronic cholecystitis. B: Small bowel, partial resection:     Small bowel with serosal surface hemorrhage and 1.5 cm gross defect.     Negative for dysplasia, neoplasia, and significant inflammation. Procedures:   DATE OF PROCEDURE:  2022     PREOPERATIVE DIAGNOSES:  1. Cholelithiasis. 2.  Left-sided abdominal pain.     POSTOPERATIVE DIAGNOSES:  1. Cholelithiasis. 2.  Left-sided abdominal pain with severe adhesive disease.     OPERATIONS:  1. Laparoscopic cholecystectomy with cholangiogram.  2.  Laparoscopic lysis of adhesions with conversion to open. Mini-laparotomy, lysis of adhesions, segmental small bowel resection.     SURGEON:  Farzad Lopez. MD Helen     ANESTHESIA:  General.     COMPLICATIONS:  Need for open with small bowel tear.     BLOOD LOSS:  50 mL.     INDICATION FOR PROCEDURE:  The patient is a 77-year-old female who has  been having persistent left-sided abdominal pain going down to the lower  abdomen, but on workup was found to have cholelithiasis, milk of calcium  bile, possible gallbladder wall calcification. The pain in the left  side had persisted and it was felt that laparoscopy was warranted  feeling that adhesions might be likely but we should remove the  gallbladder at the same time.     FINDINGS:  The patient had difficult gallbladder dissection due to  reaction in the hepatoduodenal ligament. Cholangiogram was performed,  although there was no proximal filling of the hepatic radicals, although  we verified that the large dilated cystic duct was going into the  gallbladder. Then, there were adhesions in the left lower abdominal  wall and into the lower pelvis. In the process of lysing the adhesions  laparoscopically, there was a serosal tear plus we could not get the  other adhesions down due to bowel being tight against the peritoneum. We felt we should do a mini laparotomy to make sure that bowel was okay  and also to finish the adhesion lysis. In the process of lysing the  rest of the adhesions, there was again a tear of the small bowel and it  was elected to resect as we began to close the defect that seemed to be  creating a stricture.     DESCRIPTION OF THE PROCEDURE:  The patient was brought to the operating  suite, placed supine on the operating room table. After adequate  inhalational anesthesia was administered, the patient's abdomen was  prepped and draped in usual sterile fashion. Incision was made below  the umbilicus. A Veress needle was placed, and CO2 was insufflated to a  pressure of 15.   Veress needle was lower  abdomen. There was clearly tight omental adhesions when we began the  lysis of adhesions of omentum. As we got down towards the midline, as  we were lysing some omentum, there was some bowel that was there, and  there was a little tear in the serosa. We could also then see bowel  that was tightly adhered, and I felt that we should convert to a  mini-laparotomy to check this area of serosal tear and take the rest of  the adhesions down in the midline. The trocars were removed as air was  aspirated out of the abdominal cavity. A midline incision was made from  the umbilicus down to the pubis, taken down through the subcutaneous  tissue to linea alba, and we made entrance into the abdominal cavity. There was some immediately and further omental adhesions that were taken  down, and then there was very tight small bowel adhered up to the  peritoneum. This was right at the midline lower  incision. We  found the area of serosal tear of the bowel, but then this part of the  bowel was completely adhered up to the peritoneum, and in the process of  dissecting it away, there was tear made in the small bowel, but we  continued to free it up, which was quite difficult, then we had this  segment of bowel completely free. We began this doing a suture repair  of this enterotomy, and it was clear that we were probably going to  stricturing it down, so then we elected to just resect this part of the  bowel. It should also be noted when we entered into the abdominal  cavity, there was no evidence of any succus that had spilled to that  point. We fired AGATHA on each side of this rent dividing the small bowel  and then took the mesentery down with Alice clamps and removed this from  3 inches of specimen of small bowel. We then did a side-to-side  anastomosis with the AGATHA and closed the opening with TA-60 stapler.   We  continued to free up some loops of small bowel that all the adhesions in  the abdomen were free, and we also then resected a little portion of the  omentum that had been stuck down into the pelvis. We irrigated with  IrriSept.  We then re-draped, re-gowned and gloved and closure was  begun. We suctioned the IrriSept out. #1 Vicryl was used to close the  muscle, running 4-0 Vicryl used to close the skin, and the other trocar  ports were closed with 4-0 Vicryl. The patient tolerated the procedure  well.       Code Status: Full Code     Consults:   none    Examination:  Vitals:  Vitals:    05/31/22 1519 05/31/22 1915 06/01/22 0408 06/01/22 0829   BP: 124/76 139/67 125/70 130/82   Pulse: 79 75 68 69   Resp: 18 16 18 20   Temp: 98.1 °F (36.7 °C) 97.9 °F (36.6 °C) 98.2 °F (36.8 °C) 97.6 °F (36.4 °C)   TempSrc: Oral Oral Oral Oral   SpO2: 94%  98% 96%     Weight:       24 hour intake/output:    Intake/Output Summary (Last 24 hours) at 6/1/2022 1401  Last data filed at 6/1/2022 1230  Gross per 24 hour   Intake 2588.82 ml   Output    Net 2588.82 ml       General appearance - oriented to person, place, and time  Chest - clear to auscultation, no wheezes, rales or rhonchi, symmetric air entry  Heart - normal rate and regular rhythm  Abdomen - tenderness noted as expected, softly distended, active bowel sounds  Surgical Incision: well approximated, no erythema no drainage   Neurological - alert, oriented, normal speech, no focal findings or movement disorder noted  Extremities - peripheral pulses normal, no pedal edema, no clubbing or cyanosis  Skin - normal coloration and turgor, no rashes, no suspicious skin lesions noted    Significant Diagnostics:   Radiology: FLUORO FOR SURGICAL PROCEDURES    Result Date: 5/27/2022  Radiology exam is complete. No Radiologist dictation. Please follow up with ordering provider.        Labs:   Recent Results (from the past 72 hour(s))   CBC    Collection Time: 05/31/22  6:03 AM   Result Value Ref Range    WBC 6.5 4.8 - 10.8 thou/mm3    RBC 3.48 (L) 4.20 - 5.40 mill/mm3 Hemoglobin 10.9 (L) 12.0 - 16.0 gm/dl    Hematocrit 31.4 (L) 37.0 - 47.0 %    MCV 90.2 81.0 - 99.0 fL    MCH 31.3 26.0 - 33.0 pg    MCHC 34.7 32.2 - 35.5 gm/dl    RDW-CV 12.7 11.5 - 14.5 %    RDW-SD 41.1 35.0 - 45.0 fL    Platelets 016 434 - 498 thou/mm3    MPV 11.0 9.4 - 12.4 fL       Patient Instructions:    DR. Lozano [facility]    Pt Name: Hari Lobo Record Number: [id number]  Today's Date: 6/1/2022    GENERAL ANESTHESIA OR SEDATION  1. Do not drive or operate hazardous machinery for 24 hours. 2. Do not make important business or personal decisions for 24 hours. 3. Do not drink alcoholic beverages or use tobacco for 24 hours. ACTIVITY INSTRUCTIONS:  Ambulate 4 times a day for approximately 10-15  minutes each time     You may resume normal activity tomorrow. Do not engage in strenuous activity that may place stress on your incision. You may drive when no longer taking pain medication and you are able to comfortably use the gas/brake pedal. Do not drive long distance, in town driving recommended    avoid heavy lifting, tugging, pullings greater than 10-15 lbs for 6 weeks postoperatively, or until released by Physician/CNP      DIET INSTRUCTIONS:  Normal at home diet    MEDICATIONS  You may resume your daily prescription medication schedule unless otherwise specified. Pain medication at discharge - use only as prescribed- refills may be available to you at your follow up appointments if needed and warranted. Narcotics should be used for only short term and we highly encouraged our patients to wean off appropriately and use other means for pain such as non pharmacologic measures and over the counter tylenol or ibuprofen if no restrictions apply. We do  know that surgical pain is real and will not hesitate to help eliminate some of your discomfort.  However we will not be able to completely make you pain free and it is important to determine what pain level is tolerable for you     Narcotics cause constipation and we recommend taking a colace daily and Miralax if needed to help reduce the risk of constipation    Increasing water intake if no restrictions will also help eliminate constipation    Ambulation 4 times a day 15 minute each time will help reduce pain each day and help relieve constipation      WOUND/DRESSING INSTRUCTIONS:  Always ensure you and your care giver clean hands before and after caring for the  wound. Keep dressing clean and dry, Change when soiled or wet. Leave incision open to air if not draining   Allow steri-strips to fall off on their own. Ice operative site for 20 minutes 4 times a day as needed     May wash over incision in shower daily,  but do not soak in a bath. Keep the abdominal binder in place during the day. May remove to shower and at night. ABDOMINAL/LAPAROSCOPIC SURGERY  [x]You are encouraged to get up and move around as this helps with the circulation and speeds up the healing process. [x]Breath deeply and cough from time to time. This helps to clear your lungs and helps prevent pneumonia. [x]Supporting your incision with a pillow or your hand helps to minimize discomfort and pain. [x]Laparoscopic patients may develop shoulder pain in the first 48 hours from the gas used during the procedure. FOLLOW-UP CARE. SPECIFICALLY WATCH FOR:   Fever over 101 degrees by mouth   Increased redness, warmth, hardness at operative site. Blood soaked dressing (small amounts of oozing may be normal.)   Increased or progressive drainage from the surgical area   Inability to urinate or blood in the urine   Pain not relieved by the medications ordered   Persistent nausea and/or vomiting, unable to retain fluids. FOLLOW-UP APPOINTMENT:  As scheduled     Call my office if you have any problem that concerns you [phone]. After hours, you can reach the answering service via the office phone number.  IF YOU NEED IMMEDIATE ATTENTION, GO TO THE EMERGENCY ROOM AND YOUR DOCTOR WILL BE CONTACTED. Prepared By:  EULOGIO Silva CNP, CNP  For Michelle Aguillon MD    Electronically signed 6/1/2022 at 10:12 AM    Diet: ADULT DIET; Regular      Follow-up visits:   No follow-up provider specified. Discharge condition: fair  Disposition: Home  Time spent on discharge: 35 minutes     Discharge Medications:     Medication List      START taking these medications    traMADol 50 MG tablet  Commonly known as: ULTRAM  Take 1 tablet by mouth every 6 hours as needed for Pain for up to 7 days. CHANGE how you take these medications    estradiol 1 mg tablet  Commonly known as: ESTRACE  Take 1 tablet by mouth 2 times daily. What changed:   · how much to take  · when to take this  · additional instructions        CONTINUE taking these medications    hyoscyamine 125 MCG sublingual tablet  Commonly known as: LEVSIN/SL     naproxen 500 MG tablet  Commonly known as: NAPROSYN  Take 1 tablet by mouth 2 times daily as needed for Pain     pantoprazole sodium 40 MG Pack packet  Commonly known as: PROTONIX     Probiotic-10 Chew           Where to Get Your Medications      These medications were sent to [address] [place] [address] [phone]  [address] [place] [address]/ Krystian Flores. [facility]    Phone: [phone]   · traMADol 50 MG tablet       Daily progress Note:   Patient doing well. She is tolerating meals and having bowel movements. The incisions look good. Denies N&V. No chest pain or SOB. Discussed discharge instructions, verbalized understanding. No questions at this time. Pain medication sent to pharmacy.     Electronically signed by EULOGIO Silva CNP on 6/1/2022 at 2:01 PM

## 2022-06-01 NOTE — PROGRESS NOTES
Discharge instructions reviewed with patient. Questions answered and she stated understanding. Sent with AVS, CHG soap, incentive spirometer, and all other personal belongings.

## 2022-06-03 ENCOUNTER — TELEPHONE (OUTPATIENT)
Dept: FAMILY MEDICINE CLINIC | Age: 57
End: 2022-06-03

## 2022-06-03 NOTE — TELEPHONE ENCOUNTER
[facility] Transitions Initial Follow Up Call    Outreach made within 2 business days of discharge: Yes    Patient: Mark Malcolm Patient : 1965   MRN: [id number]  Reason for Admission: There are no discharge diagnoses documented for the most recent discharge. Discharge Date: 22       Spoke with: Varsha Joseph    Discharge department/facility: [facility]. [facility]    [facility] Patient Contact:  Was patient able to fill all prescriptions: Yes  Was patient instructed to bring all medications to the follow-up visit: Yes  Is patient taking all medications as directed in the discharge summary?  Yes  Does patient understand their discharge instructions: Yes  Does patient have questions or concerns that need addressed prior to 7-14 day follow up office visit: No      Follow Up  Future Appointments   Date Time Provider Cookie Hernández   2022 10:20 AM [facility]   2022 10:30 AM EULOGIO Rodríguez - [facility]

## 2022-06-09 ENCOUNTER — OFFICE VISIT (OUTPATIENT)
Dept: FAMILY MEDICINE CLINIC | Age: 57
End: 2022-06-09
Payer: COMMERCIAL

## 2022-06-09 ENCOUNTER — HOSPITAL ENCOUNTER (OUTPATIENT)
Age: 57
Discharge: HOME | End: 2022-06-09
Payer: COMMERCIAL

## 2022-06-09 ENCOUNTER — TELEPHONE (OUTPATIENT)
Dept: SURGERY | Age: 57
End: 2022-06-09

## 2022-06-09 ENCOUNTER — HOSPITAL ENCOUNTER (OUTPATIENT)
Dept: GENERAL RADIOLOGY | Age: 57
Discharge: HOME | End: 2022-06-09
Payer: COMMERCIAL

## 2022-06-09 VITALS
BODY MASS INDEX: 24.25 KG/M2 | WEIGHT: 131.8 LBS | TEMPERATURE: 98.2 F | OXYGEN SATURATION: 99 % | HEART RATE: 96 BPM | SYSTOLIC BLOOD PRESSURE: 100 MMHG | HEIGHT: 62 IN | DIASTOLIC BLOOD PRESSURE: 62 MMHG | RESPIRATION RATE: 16 BRPM

## 2022-06-09 DIAGNOSIS — R07.9 CHEST PAIN, UNSPECIFIED TYPE: ICD-10-CM

## 2022-06-09 DIAGNOSIS — Z09 HOSPITAL DISCHARGE FOLLOW-UP: Primary | ICD-10-CM

## 2022-06-09 DIAGNOSIS — R50.9 FEVER AND CHILLS: ICD-10-CM

## 2022-06-09 DIAGNOSIS — R10.84 GENERALIZED ABDOMINAL PAIN: ICD-10-CM

## 2022-06-09 LAB
ALBUMIN SERPL-MCNC: 4.8 G/DL (ref 3.5–5.1)
ALP BLD-CCNC: 86 U/L (ref 38–126)
ALT SERPL-CCNC: 23 U/L (ref 11–66)
ANION GAP SERPL CALCULATED.3IONS-SCNC: 15 MEQ/L (ref 8–16)
AST SERPL-CCNC: 17 U/L (ref 5–40)
BACTERIA: ABNORMAL /HPF
BASOPHILS # BLD: 0.6 %
BASOPHILS ABSOLUTE: 0.1 THOU/MM3 (ref 0–0.1)
BILIRUB SERPL-MCNC: 0.6 MG/DL (ref 0.3–1.2)
BILIRUBIN URINE: NEGATIVE
BLOOD, URINE: NEGATIVE
BUN BLDV-MCNC: 11 MG/DL (ref 7–22)
CALCIUM SERPL-MCNC: 9.6 MG/DL (ref 8.5–10.5)
CASTS 2: ABNORMAL /LPF
CASTS UA: ABNORMAL /LPF
CHARACTER, URINE: ABNORMAL
CHLORIDE BLD-SCNC: 102 MEQ/L (ref 98–111)
CO2: 24 MEQ/L (ref 23–33)
COLOR: YELLOW
CREAT SERPL-MCNC: 0.6 MG/DL (ref 0.4–1.2)
CRYSTALS, UA: ABNORMAL
EOSINOPHIL # BLD: 3.8 %
EOSINOPHILS ABSOLUTE: 0.5 THOU/MM3 (ref 0–0.4)
EPITHELIAL CELLS, UA: ABNORMAL /HPF
ERYTHROCYTE [DISTWIDTH] IN BLOOD BY AUTOMATED COUNT: 13 % (ref 11.5–14.5)
ERYTHROCYTE [DISTWIDTH] IN BLOOD BY AUTOMATED COUNT: 42.5 FL (ref 35–45)
GFR SERPL CREATININE-BSD FRML MDRD: > 90 ML/MIN/1.73M2
GLUCOSE BLD-MCNC: 155 MG/DL (ref 70–108)
GLUCOSE URINE: 250 MG/DL
HCT VFR BLD CALC: 41.2 % (ref 37–47)
HEMOGLOBIN: 14.1 GM/DL (ref 12–16)
IMMATURE GRANS (ABS): 0.14 THOU/MM3 (ref 0–0.07)
IMMATURE GRANULOCYTES: 1 %
KETONES, URINE: ABNORMAL
LEUKOCYTE ESTERASE, URINE: NEGATIVE
LYMPHOCYTES # BLD: 12.8 %
LYMPHOCYTES ABSOLUTE: 1.7 THOU/MM3 (ref 1–4.8)
MCH RBC QN AUTO: 30.5 PG (ref 26–33)
MCHC RBC AUTO-ENTMCNC: 34.2 GM/DL (ref 32.2–35.5)
MCV RBC AUTO: 89.2 FL (ref 81–99)
MISCELLANEOUS 2: ABNORMAL
MONOCYTES # BLD: 7 %
MONOCYTES ABSOLUTE: 1 THOU/MM3 (ref 0.4–1.3)
MUCUS: ABNORMAL
NITRITE, URINE: NEGATIVE
NUCLEATED RED BLOOD CELLS: 0 /100 WBC
PH UA: 5 (ref 5–9)
PLATELET # BLD: 325 THOU/MM3 (ref 130–400)
PMV BLD AUTO: 11.8 FL (ref 9.4–12.4)
POTASSIUM SERPL-SCNC: 3.5 MEQ/L (ref 3.5–5.2)
PROTEIN UA: ABNORMAL
RBC # BLD: 4.62 MILL/MM3 (ref 4.2–5.4)
RBC URINE: ABNORMAL /HPF
RENAL EPITHELIAL, UA: ABNORMAL
SEG NEUTROPHILS: 74.8 %
SEGMENTED NEUTROPHILS ABSOLUTE COUNT: 10.2 THOU/MM3 (ref 1.8–7.7)
SODIUM BLD-SCNC: 141 MEQ/L (ref 135–145)
SPECIFIC GRAVITY, URINE: 1.03 (ref 1–1.03)
TOTAL PROTEIN: 7.5 G/DL (ref 6.1–8)
UROBILINOGEN, URINE: 0.2 EU/DL (ref 0–1)
WBC # BLD: 13.6 THOU/MM3 (ref 4.8–10.8)
WBC UA: ABNORMAL /HPF
YEAST: ABNORMAL

## 2022-06-09 PROCEDURE — 74018 RADEX ABDOMEN 1 VIEW: CPT

## 2022-06-09 PROCEDURE — 1111F DSCHRG MED/CURRENT MED MERGE: CPT | Performed by: NURSE PRACTITIONER

## 2022-06-09 PROCEDURE — 80053 COMPREHEN METABOLIC PANEL: CPT

## 2022-06-09 PROCEDURE — 85025 COMPLETE CBC W/AUTO DIFF WBC: CPT

## 2022-06-09 PROCEDURE — 81001 URINALYSIS AUTO W/SCOPE: CPT

## 2022-06-09 PROCEDURE — 93000 ELECTROCARDIOGRAM COMPLETE: CPT | Performed by: NURSE PRACTITIONER

## 2022-06-09 PROCEDURE — 99214 OFFICE O/P EST MOD 30 MIN: CPT | Performed by: NURSE PRACTITIONER

## 2022-06-09 PROCEDURE — 36415 COLL VENOUS BLD VENIPUNCTURE: CPT

## 2022-06-09 NOTE — PROGRESS NOTES
Transition of Care Visit/Hospital Follow Up:      Mark Malcolm is a 59 y.o. female that presents for Follow-Up from Hospital ([address] [place] [address], )        Date of Discharge:   6/1/22  Was patient contacted within 2 business days of discharge (see chart for documentation):  yes - 6/3      Patient presents for hospital follow up. Patient recently hospitalized at [facility] for treatment of LUQ pain, cholelithiasis. Symptoms prior to admission:  LUQ pain     Hospital Course per DC Summary:      Hospital Course:   Mark Malcolm is a 59 y.o. female admitted to [facility] on 5/27/2022 for left upper quadrant pain with possible adhesions and cholelithiasis. she was taken to the operative suite per Fabian Monge MD and the planned procedure was performed as noted above. She was admitted to [address] [place] [address] for postoperative care and was initially managed with bowel rest, analgesics for pain control, IV fluid hydration, GI and DVT prophylaxis and IV antibiotics. Over the hospital stay she slowly improved in ability to tolerate increasing levels of activity and to take po fluids, solid foods with evidence of returning bowel function, and spontaneously voiding. At the time of discharge she was able to tolerate pain with oral analgesic and was medically stable.     FINAL DIAGNOSIS:   A: Gallbladder, cholecystectomy:     Chronic cholecystitis. B: Small bowel, partial resection:     Small bowel with serosal surface hemorrhage and 1.5 cm gross defect.     Negative for dysplasia, neoplasia, and significant inflammation. Procedures:   DATE OF PROCEDURE:  05/27/2022     PREOPERATIVE DIAGNOSES:  1.  Cholelithiasis. 2.  Left-sided abdominal pain.     POSTOPERATIVE DIAGNOSES:  1.  Cholelithiasis. 2.  Left-sided abdominal pain with severe adhesive disease.     OPERATIONS:  1.  Laparoscopic cholecystectomy with cholangiogram.  2.  Laparoscopic lysis of adhesions with conversion to open.    Mini-laparotomy, lysis of adhesions, segmental small bowel resection.     SURGEON:  Yvon Watkins MD     ANESTHESIA:  General.     COMPLICATIONS:  Need for open with small bowel tear.     BLOOD LOSS:  50 mL.       Medication changes at discharge:  Med list reconciled today    Clinical course since discharge:    Was doing well since this past Monday night. Started having severe fatigue, leg cramps. Following day cramps were gone but started having abdominal pain, nausea, chest pain, shortness of breath, abdominal distention. Was having very small amounts of slightly formed stools daily until yesterday started having liquid stools, small amounts, frequently and nausea and pain worsened. Not taking any pain meds, hasn't since being home. Denies bloody stools or urine, but urine has been a lot darker. Reports fever and chills starting yesterday. Has not talked with Dr. Felicita Treadwell office about her concerns, knew she had an appt today. Current Outpatient Medications   Medication Sig Dispense Refill    hyoscyamine (LEVSIN/SL) 125 MCG sublingual tablet DISSOLVE 1 TABLET UNDER TONGUE EVERY FOUR HOURS AS DIRECTED for abdominal pain      pantoprazole sodium (PROTONIX) 40 MG PACK packet Take 40 mg by mouth every morning (before breakfast)       Probiotic Product (PROBIOTIC-10) CHEW Take by mouth      naproxen (NAPROSYN) 500 MG tablet Take 1 tablet by mouth 2 times daily as needed for Pain (Patient not taking: Reported on 4/19/2022) 30 tablet 3    estradiol (ESTRACE) 1 MG tablet Take 1 tablet by mouth 2 times daily. (Patient taking differently: Take 2 mg by mouth daily Patient takes 2 mg once a day) 60 tablet 3     No current facility-administered medications for this visit.        Past Medical History:   Diagnosis Date    Anxiety     Depression     Dysphagia     Gallstones     Gastric ulcer     GERD (gastroesophageal reflux disease)     Headache(784.0)     Prolonged emergence from general anesthesia     Shortness of breath        Past Surgical History:   Procedure Laterality Date     SECTION      CHOLECYSTECTOMY, LAPAROSCOPIC N/A 2022    Laparoscopic Cholecystectomy with Laparoscopy Lysis of Adhesions Converted to Open with Small Segemental Bowel Resection performed by Ethan Coello MD at [place] [address]  ,    [facility]      Dr. Lawler Lugabriele CYST REMOVAL  1982    ovarian    HYSTERECTOMY (CERVIX STATUS UNKNOWN)  2004    KNEE SURGERY      UPPER GASTROINTESTINAL ENDOSCOPY  2012    UPPER GASTROINTESTINAL ENDOSCOPY  2022    Dr. Serina Bourne       Social History     Tobacco Use    Smoking status: Former Smoker     Packs/day: 0.10     Years: 2.00     Pack years: 0.20     Types: Cigarettes     Quit date: 2021     Years since quittin.6    Smokeless tobacco: Never Used    Tobacco comment: Pt is a social smoker   Substance Use Topics    Alcohol use: Yes     Comment: rarely    Drug use: No       Family History   Problem Relation Age of Onset    High Blood Pressure Mother     Heart Disease Mother     Irritable Bowel Syndrome Father     Colon Polyps Father     Dementia Father     Irritable Bowel Syndrome Sister          I have reviewed the patient's past medical history, past surgical history, allergies, medications, social and family history and I have made updates where appropriate.         PHYSICAL EXAM:  /62   Pulse 96   Temp 98.2 °F (36.8 °C) (Oral)   Resp 16   Ht 5' 2\" (1.575 m)   Wt 131 lb 12.8 oz (59.8 kg)   SpO2 99%   BMI 24.11 kg/m²   General Appearance: well developed and well- nourished, in no acute distress  Head: normocephalic and atraumatic  ENT: external ear and ear canal normal bilaterally, nose without deformity, nasal mucosa and turbinates normal without polyps, oropharynx normal, dentition is normal for age, no lip or gum lesions noted  Neck: supple and non-tender without mass, no thyromegaly or thyroid nodules, no cervical lymphadenopathy  Pulmonary/Chest: clear to auscultation bilaterally- no wheezes, rales or rhonchi, normal air movement, no respiratory distress or retractions  Cardiovascular: normal rate, regular rhythm, normal S1 and S2, no murmurs, rubs, clicks, or gallops, distal pulses intact  Abdomen: diffuse tenderness, abdomen distended, surgical incisions healing well, no discharge or redness, ABS- RUQ and RLQ, hypoABS- LUQ, LLQ  Extremities: no cyanosis, clubbing or edema of the lower extremities  Psych:  Normal affect without evidence of depression or anxiety, insight and judgement are appropriate, memory appears intact  Skin: warm and dry, no rash or erythema      Diagnostic test results reviewed: EKG    Patient risk of morbidity and mortality: moderate      ASSESSMENT & PLAN  Raisa Bermudez was seen today for follow-up from hospital.    Diagnoses and all orders for this visit:    Hospital discharge follow-up  -     LA DISCHARGE MEDS RECONCILED W/ CURRENT OUTPATIENT MED LIST    Generalized abdominal pain  -     CBC with Auto Differential; Future  -     Comprehensive Metabolic Panel; Future  -     XR ABDOMEN (KUB) (SINGLE AP VIEW); Future  -     Urinalysis with Reflex to Culture; Future    Chest pain, unspecified type  -     EKG 12 lead    Fever and chills  -     Urinalysis with Reflex to Culture; Future    Recommend testing this am  Will reach out to Dr. Amina Knowles office with update in case they want to see her       No follow-ups on file. Level of medical complexity:  moderate      All copied or forwarded information in the progress note was verified by me to be accurate at the time of visit  Patient's past medical, surgical, social and family history were reviewed and updated      All patient questions answered. Patient voiced understanding.

## 2022-06-09 NOTE — TELEPHONE ENCOUNTER
This was a follow up phone call I received from Tahir Moran NP regarding patient. She was seen in her office today with complains of abdominal pain, distention, nausea, fever, chills, frequent liquid stools, chest pain, back pain. Kayla West ordered a KUB, EKG, CBC, CMP and urine. Patient states she had some errands to run before heading to [facility] for test.  I encouraged her to get these test done ASA, this will allow me to review the test, with the possibility she may need admitted. She states she is not coming back to hospital, was not happy with [facility]. Again reiterated that if testing suggested that she needed to be admitted that she needs to reconsider. I will watch for results, discuss findings with Dr Sylvia Maria if concerning and keep her updated.      Electronically signed by EULOGIO Shaw CNP on 6/9/2022 at 12:02 PM

## 2022-06-14 ENCOUNTER — OFFICE VISIT (OUTPATIENT)
Dept: SURGERY | Age: 57
End: 2022-06-14

## 2022-06-14 VITALS
WEIGHT: 128.9 LBS | DIASTOLIC BLOOD PRESSURE: 60 MMHG | OXYGEN SATURATION: 98 % | HEIGHT: 62 IN | TEMPERATURE: 97.2 F | SYSTOLIC BLOOD PRESSURE: 100 MMHG | HEART RATE: 102 BPM | BODY MASS INDEX: 23.72 KG/M2 | RESPIRATION RATE: 16 BRPM

## 2022-06-14 DIAGNOSIS — Z09 POSTOP CHECK: Primary | ICD-10-CM

## 2022-06-14 PROCEDURE — 1036F TOBACCO NON-USER: CPT | Performed by: SURGERY

## 2022-06-14 PROCEDURE — G8420 CALC BMI NORM PARAMETERS: HCPCS | Performed by: SURGERY

## 2022-06-14 PROCEDURE — 1111F DSCHRG MED/CURRENT MED MERGE: CPT | Performed by: SURGERY

## 2022-06-14 PROCEDURE — 99024 POSTOP FOLLOW-UP VISIT: CPT | Performed by: SURGERY

## 2022-06-14 PROCEDURE — 3017F COLORECTAL CA SCREEN DOC REV: CPT | Performed by: SURGERY

## 2022-06-14 PROCEDURE — G8427 DOCREV CUR MEDS BY ELIG CLIN: HCPCS | Performed by: SURGERY

## 2022-06-14 NOTE — PROGRESS NOTES
[facility] [address] [facility] [address] [facility] [address]  Dept: [phone]  Dept Fax: [phone]  Loc: [phone]    Visit Date: 2022    Nayana Mathur is a 59 y.o. female who presentstoday for:  Chief Complaint   Patient presents with    Post-Op Check     s/p Laparoscopic cholecystectomy with cholangiogram,  Laparoscopic lysis of adhesions with conversion to open, Mini-laparotomy, lysis of adhesions, segmental small bowel resection-2022       HPI:     HPI as above patient had a laparoscopic cholecystectomy and then a laparoscopic lysis of adhesions that had to be converted to an open secondary to small bowel serosal tear during the lysis of adhesions. A small segment of the small bowel was excised this was almost 3 weeks ago. Patient spent several days in the hospital but went home she initially was doing well but then had onset of abdominal bloating and discomfort.   Patient had several days where she did not feel well but is now feeling a lot better some residual abdominal pain but overall no bloating no nausea or vomiting bowel movements are good    Past Medical History:   Diagnosis Date    Anxiety     Depression     Dysphagia     Gallstones     Gastric ulcer     GERD (gastroesophageal reflux disease)     Headache(784.0)     Prolonged emergence from general anesthesia     Shortness of breath       Past Surgical History:   Procedure Laterality Date     SECTION      CHOLECYSTECTOMY, LAPAROSCOPIC N/A 2022    Laparoscopic Cholecystectomy with Laparoscopy Lysis of Adhesions Converted to Open with Small Segemental Bowel Resection performed by Deepak Bartlett MD at [facility]  ,    [address] [facility], [address]      Dr. Brandon Cole    CYST REMOVAL  1982    ovarian    HYSTERECTOMY (CERVIX STATUS UNKNOWN)  2004    KNEE SURGERY      UPPER GASTROINTESTINAL ENDOSCOPY  2012    UPPER GASTROINTESTINAL ENDOSCOPY  2022    Dr. Lul Betts        Family History   Problem Relation Age of Onset    High Blood Pressure Mother     Heart Disease Mother     Irritable Bowel Syndrome Father     Colon Polyps Father     Dementia Father     Irritable Bowel Syndrome Sister         Social History     Tobacco Use    Smoking status: Former Smoker     Packs/day: 0.10     Years: 2.00     Pack years: 0.20     Types: Cigarettes     Quit date: 2021     Years since quittin.7    Smokeless tobacco: Never Used    Tobacco comment: Pt is a social smoker   Substance Use Topics    Alcohol use: Yes     Comment: rarely          Current Outpatient Medications   Medication Sig Dispense Refill    hyoscyamine (LEVSIN/SL) 125 MCG sublingual tablet DISSOLVE 1 TABLET UNDER TONGUE EVERY FOUR HOURS AS DIRECTED for abdominal pain      pantoprazole sodium (PROTONIX) 40 MG PACK packet Take 40 mg by mouth every morning (before breakfast)       Probiotic Product (PROBIOTIC-10) CHEW Take by mouth      naproxen (NAPROSYN) 500 MG tablet Take 1 tablet by mouth 2 times daily as needed for Pain (Patient not taking: Reported on 2022) 30 tablet 3    estradiol (ESTRACE) 1 MG tablet Take 1 tablet by mouth 2 times daily. (Patient taking differently: Take 2 mg by mouth daily Patient takes 2 mg once a day) 60 tablet 3     No current facility-administered medications for this visit. Allergies   Allergen Reactions    Adhesive Tape     Biaxin [Clarithromycin]     Morphine     Nexium [Esomeprazole Magnesium] Other (See Comments)     Nausea      Cortisone Rash     as       Subjective:      Review of Systems    Objective: There were no vitals taken for this visit.     Physical Exam abdomen is soft bowel sounds are positive wound is healing well       Results for orders placed or performed during the hospital encounter of 22   CBC with Auto Differential   Result Value Ref Range    WBC 13.6 (H) 4.8 - 10.8 thou/mm3    RBC WBC, UA 0-2 0-4/hpf /hpf    Epithelial Cells, UA 10-15 3-5/hpf /hpf    Mucus, UA THREADS NONE SEEN/THREA    Bacteria, UA NONE SEEN FEW/NONE SEEN /hpf    Casts UA NONE SEEN NONE SEEN /lpf    Crystals, UA OXALATE NONE SEEN    Renal Epithelial, UA NONE SEEN NONE SEEN    Yeast, UA FEW BUDDING NONE SEEN    CASTS 2 NONE SEEN NONE SEEN /lpf    MISCELLANEOUS 2 NONE SEEN        Assessment:     Probable mild ileus postop but is improved now patient will continue to monitor    Plan:     Return to office in 2 weeks      Electronicallysigned by Rich Cortez MD on 6/14/2022 at 9:49 AM

## 2022-06-21 ENCOUNTER — OFFICE VISIT (OUTPATIENT)
Dept: SURGERY | Age: 57
End: 2022-06-21

## 2022-06-21 VITALS
HEIGHT: 62 IN | BODY MASS INDEX: 23.98 KG/M2 | HEART RATE: 84 BPM | DIASTOLIC BLOOD PRESSURE: 66 MMHG | TEMPERATURE: 97.5 F | RESPIRATION RATE: 16 BRPM | WEIGHT: 130.3 LBS | OXYGEN SATURATION: 98 % | SYSTOLIC BLOOD PRESSURE: 104 MMHG

## 2022-06-21 DIAGNOSIS — Z09 POSTOP CHECK: Primary | ICD-10-CM

## 2022-06-21 PROCEDURE — 3017F COLORECTAL CA SCREEN DOC REV: CPT | Performed by: SURGERY

## 2022-06-21 PROCEDURE — G8427 DOCREV CUR MEDS BY ELIG CLIN: HCPCS | Performed by: SURGERY

## 2022-06-21 PROCEDURE — G8420 CALC BMI NORM PARAMETERS: HCPCS | Performed by: SURGERY

## 2022-06-21 PROCEDURE — 1111F DSCHRG MED/CURRENT MED MERGE: CPT | Performed by: SURGERY

## 2022-06-21 PROCEDURE — 99024 POSTOP FOLLOW-UP VISIT: CPT | Performed by: SURGERY

## 2022-06-21 PROCEDURE — 1036F TOBACCO NON-USER: CPT | Performed by: SURGERY

## 2022-06-25 NOTE — PROGRESS NOTES
[address] [place] [address] [address] [facility] [address]  Dept: [phone]  Dept Fax: [phone]  Loc: [phone]    Visit Date: 2022    Armand Hernandez is a 59 y.o. female who presentstoday for:  Chief Complaint   Patient presents with    Post-Op Check     s/p Laparoscopic cholecystectomy with cholangiogram,  Laparoscopic lysis of adhesions with conversion to open, Mini-laparotomy, lysis of adhesions, segmental small bowel resection-2022  Last office visit 22       HPI:     HPI as above patient is here for postoperative check. Had a laparoscopic cholecystectomy along with attempted laparoscopic lysis of adhesions in the lower abdomen there was tearing of a small bowel and converted to a mini laparotomy where a segmental small bowel resection was performed. She is now 3-1/2 weeks out from the surgery.   Initially she did well but then began having abdominal bloating however she is much better at this time bowel movements are normal tolerating a diet much decreased pain    Past Medical History:   Diagnosis Date    Anxiety     Depression     Dysphagia     Gallstones     Gastric ulcer     GERD (gastroesophageal reflux disease)     Headache(784.0)     Prolonged emergence from general anesthesia     Shortness of breath       Past Surgical History:   Procedure Laterality Date     SECTION      CHOLECYSTECTOMY, LAPAROSCOPIC N/A 2022    Laparoscopic Cholecystectomy with Laparoscopy Lysis of Adhesions Converted to Open with Small Segemental Bowel Resection performed by Bello Moss MD at [address] [place] [address], [address]  2013    COLONOSCOPY      Dr. Gabriel Aly    CYST REMOVAL  1982    ovarian    HYSTERECTOMY ([address] [place] [address])  2004    KNEE SURGERY      UPPER GASTROINTESTINAL ENDOSCOPY  2012    UPPER GASTROINTESTINAL ENDOSCOPY  2022    Dr. Evangelina Tripp        Family History Problem Relation Age of Onset    High Blood Pressure Mother     Heart Disease Mother     Irritable Bowel Syndrome Father     Colon Polyps Father     Dementia Father     Irritable Bowel Syndrome Sister         Social History     Tobacco Use    Smoking status: Former Smoker     Packs/day: 0.10     Years: 2.00     Pack years: 0.20     Types: Cigarettes     Quit date: 2021     Years since quittin.7    Smokeless tobacco: Never Used    Tobacco comment: Pt is a social smoker   Substance Use Topics    Alcohol use: Yes     Comment: rarely          Current Outpatient Medications   Medication Sig Dispense Refill    hyoscyamine (LEVSIN/SL) 125 MCG sublingual tablet DISSOLVE 1 TABLET UNDER TONGUE EVERY FOUR HOURS AS DIRECTED for abdominal pain      pantoprazole sodium (PROTONIX) 40 MG PACK packet Take 40 mg by mouth every morning (before breakfast)       Probiotic Product (PROBIOTIC-10) CHEW Take by mouth      naproxen (NAPROSYN) 500 MG tablet Take 1 tablet by mouth 2 times daily as needed for Pain 30 tablet 3    estradiol (ESTRACE) 1 MG tablet Take 1 tablet by mouth 2 times daily. (Patient taking differently: Take 2 mg by mouth daily Patient takes 2 mg once a day) 60 tablet 3     No current facility-administered medications for this visit.      Allergies   Allergen Reactions    Adhesive Tape     Biaxin [Clarithromycin]     Morphine     Nexium [Esomeprazole Magnesium] Other (See Comments)     Nausea      Cortisone Rash     as       Subjective:      Review of Systems    Objective:   /66   Pulse 84   Temp 97.5 °F (36.4 °C)   Resp 16   Ht 5' 2\" (1.575 m)   Wt 130 lb 4.8 oz (59.1 kg)   SpO2 98%   BMI 23.83 kg/m²     Physical Exam abdomen is soft bowel sounds are positive wound is healing well       Results for orders placed or performed during the hospital encounter of 22   CBC with Auto Differential   Result Value Ref Range    WBC 13.6 (H) 4.8 - 10.8 thou/mm3    RBC 4.62 4.20 - 5.40 mill/mm3    Hemoglobin 14.1 12.0 - 16.0 gm/dl    Hematocrit 41.2 37.0 - 47.0 %    MCV 89.2 81.0 - 99.0 fL    MCH 30.5 26.0 - 33.0 pg    MCHC 34.2 32.2 - 35.5 gm/dl    RDW-CV 13.0 11.5 - 14.5 %    RDW-SD 42.5 35.0 - 45.0 fL    Platelets 149 468 - 811 thou/mm3    MPV 11.8 9.4 - 12.4 fL    Seg Neutrophils 74.8 %    Lymphocytes 12.8 %    Monocytes 7.0 %    Eosinophils 3.8 %    Basophils 0.6 %    Immature Granulocytes 1.0 %    Segs Absolute 10.2 (H) 1.8 - 7.7 thou/mm3    Lymphocytes Absolute 1.7 1.0 - 4.8 thou/mm3    Monocytes Absolute 1.0 0.4 - 1.3 thou/mm3    Eosinophils Absolute 0.5 (H) 0.0 - 0.4 thou/mm3    Basophils Absolute 0.1 0.0 - 0.1 thou/mm3    Immature Grans (Abs) 0.14 (H) 0.00 - 0.07 thou/mm3    nRBC 0 /100 wbc   Comprehensive Metabolic Panel   Result Value Ref Range    Glucose 155 (H) 70 - 108 mg/dL    CREATININE 0.6 0.4 - 1.2 mg/dL    BUN 11 7 - 22 mg/dL    Sodium 141 135 - 145 meq/L    Potassium 3.5 3.5 - 5.2 meq/L    Chloride 102 98 - 111 meq/L    CO2 24 23 - 33 meq/L    Calcium 9.6 8.5 - 10.5 mg/dL    AST 17 5 - 40 U/L    Alkaline Phosphatase 86 38 - 126 U/L    Total Protein 7.5 6.1 - 8.0 g/dL    Albumin 4.8 3.5 - 5.1 g/dL    Total Bilirubin 0.6 0.3 - 1.2 mg/dL    ALT 23 11 - 66 U/L   Anion Gap   Result Value Ref Range    Anion Gap 15.0 8.0 - 16.0 meq/L   Glomerular Filtration Rate, Estimated   Result Value Ref Range    Est, Glom Filt Rate >90 ml/min/1.73m2   Urine with Reflexed Micro   Result Value Ref Range    Glucose, Ur 250 (A) NEGATIVE mg/dl    Bilirubin Urine NEGATIVE NEGATIVE    Ketones, Urine TRACE (A) NEGATIVE    Specific Gravity, Urine 1.027 1.002 - 1.030    Blood, Urine NEGATIVE NEGATIVE    pH, UA 5.0 5.0 - 9.0    Protein, UA TRACE (A) NEGATIVE    Urobilinogen, Urine 0.2 0.0 - 1.0 eu/dl    Nitrite, Urine NEGATIVE NEGATIVE    Leukocyte Esterase, Urine NEGATIVE NEGATIVE    Color, UA YELLOW STRAW-YELLOW    Character, Urine SL CLOUDY CLEAR-SL CLOUD    RBC, UA 0-2 0-2/hpf /hpf    WBC, UA 0-2 0-4/hpf /hpf    Epithelial Cells, UA 10-15 3-5/hpf /hpf    Mucus, UA THREADS NONE SEEN/THREA    Bacteria, UA NONE SEEN FEW/NONE SEEN /hpf    Casts UA NONE SEEN NONE SEEN /lpf    Crystals, UA OXALATE NONE SEEN    Renal Epithelial, UA NONE SEEN NONE SEEN    Yeast, UA FEW BUDDING NONE SEEN    CASTS 2 NONE SEEN NONE SEEN /lpf    MISCELLANEOUS 2 NONE SEEN        Assessment:     Patient is status post lap cholecystectomy and mini laparotomy with small bowel excision.   Patient had some bloating and issues but now is much improved    Plan:     Turn to office as needed      Electronicallysigned by Ethan Coello MD on 6/25/2022 at 4:28 PM

## 2022-06-30 ENCOUNTER — APPOINTMENT (OUTPATIENT)
Dept: ULTRASOUND IMAGING | Age: 57
End: 2022-06-30
Payer: COMMERCIAL

## 2022-06-30 ENCOUNTER — APPOINTMENT (OUTPATIENT)
Dept: CT IMAGING | Age: 57
End: 2022-06-30
Payer: COMMERCIAL

## 2022-06-30 ENCOUNTER — OFFICE VISIT (OUTPATIENT)
Dept: FAMILY MEDICINE CLINIC | Age: 57
End: 2022-06-30
Payer: COMMERCIAL

## 2022-06-30 ENCOUNTER — HOSPITAL ENCOUNTER (EMERGENCY)
Age: 57
Discharge: HOME | End: 2022-06-30
Attending: EMERGENCY MEDICINE
Payer: COMMERCIAL

## 2022-06-30 VITALS
TEMPERATURE: 98.6 F | OXYGEN SATURATION: 99 % | HEART RATE: 87 BPM | SYSTOLIC BLOOD PRESSURE: 112 MMHG | HEIGHT: 62 IN | RESPIRATION RATE: 19 BRPM | BODY MASS INDEX: 23.74 KG/M2 | WEIGHT: 129 LBS | DIASTOLIC BLOOD PRESSURE: 54 MMHG

## 2022-06-30 VITALS
BODY MASS INDEX: 23.85 KG/M2 | RESPIRATION RATE: 16 BRPM | SYSTOLIC BLOOD PRESSURE: 116 MMHG | OXYGEN SATURATION: 100 % | WEIGHT: 129.6 LBS | TEMPERATURE: 97.7 F | DIASTOLIC BLOOD PRESSURE: 78 MMHG | HEIGHT: 62 IN | HEART RATE: 91 BPM

## 2022-06-30 DIAGNOSIS — R10.84 GENERALIZED ABDOMINAL PAIN: Primary | ICD-10-CM

## 2022-06-30 LAB
ALBUMIN SERPL-MCNC: 4.6 G/DL (ref 3.5–5.1)
ALP BLD-CCNC: 89 U/L (ref 38–126)
ALT SERPL-CCNC: 18 U/L (ref 11–66)
ANION GAP SERPL CALCULATED.3IONS-SCNC: 17 MEQ/L (ref 8–16)
AST SERPL-CCNC: 19 U/L (ref 5–40)
BACTERIA: ABNORMAL /HPF
BASOPHILS # BLD: 0.7 %
BASOPHILS ABSOLUTE: 0 THOU/MM3 (ref 0–0.1)
BILIRUB SERPL-MCNC: 0.5 MG/DL (ref 0.3–1.2)
BILIRUBIN URINE: NEGATIVE
BILIRUBIN URINE: NEGATIVE
BLOOD URINE, POC: NEGATIVE
BLOOD, URINE: NEGATIVE
BUN BLDV-MCNC: 11 MG/DL (ref 7–22)
C-REACTIVE PROTEIN: 3.31 MG/DL (ref 0–1)
CALCIUM SERPL-MCNC: 10 MG/DL (ref 8.5–10.5)
CASTS 2: ABNORMAL /LPF
CASTS UA: ABNORMAL /LPF
CHARACTER, URINE: ABNORMAL
CHARACTER, URINE: CLEAR
CHLORIDE BLD-SCNC: 103 MEQ/L (ref 98–111)
CO2: 22 MEQ/L (ref 23–33)
COLOR, URINE: YELLOW
COLOR: YELLOW
CREAT SERPL-MCNC: 0.8 MG/DL (ref 0.4–1.2)
CRYSTALS, UA: ABNORMAL
EOSINOPHIL # BLD: 5.1 %
EOSINOPHILS ABSOLUTE: 0.3 THOU/MM3 (ref 0–0.4)
EPITHELIAL CELLS, UA: ABNORMAL /HPF
ERYTHROCYTE [DISTWIDTH] IN BLOOD BY AUTOMATED COUNT: 13 % (ref 11.5–14.5)
ERYTHROCYTE [DISTWIDTH] IN BLOOD BY AUTOMATED COUNT: 42.2 FL (ref 35–45)
GFR SERPL CREATININE-BSD FRML MDRD: 73 ML/MIN/1.73M2
GLUCOSE BLD-MCNC: 134 MG/DL (ref 70–108)
GLUCOSE URINE: 500 MG/DL
GLUCOSE URINE: NEGATIVE MG/DL
HCT VFR BLD CALC: 38.6 % (ref 37–47)
HEMOGLOBIN: 12.9 GM/DL (ref 12–16)
IMMATURE GRANS (ABS): 0.02 THOU/MM3 (ref 0–0.07)
IMMATURE GRANULOCYTES: 0.3 %
KETONES, URINE: ABNORMAL
KETONES, URINE: ABNORMAL
LACTIC ACID, SEPSIS: 1.5 MMOL/L (ref 0.5–1.9)
LEUKOCYTE CLUMPS, URINE: NEGATIVE
LEUKOCYTE ESTERASE, URINE: NEGATIVE
LIPASE: 58.5 U/L (ref 5.6–51.3)
LYMPHOCYTES # BLD: 30.8 %
LYMPHOCYTES ABSOLUTE: 1.8 THOU/MM3 (ref 1–4.8)
MCH RBC QN AUTO: 29.7 PG (ref 26–33)
MCHC RBC AUTO-ENTMCNC: 33.4 GM/DL (ref 32.2–35.5)
MCV RBC AUTO: 88.9 FL (ref 81–99)
MISCELLANEOUS 2: ABNORMAL
MONOCYTES # BLD: 8.1 %
MONOCYTES ABSOLUTE: 0.5 THOU/MM3 (ref 0.4–1.3)
MUCUS: ABNORMAL
NITRITE, URINE: NEGATIVE
NITRITE, URINE: NEGATIVE
NUCLEATED RED BLOOD CELLS: 0 /100 WBC
OSMOLALITY CALCULATION: 284.5 MOSMOL/KG (ref 275–300)
PH UA: 5 (ref 5–9)
PH, URINE: 5 (ref 5–9)
PLATELET # BLD: 242 THOU/MM3 (ref 130–400)
PMV BLD AUTO: 12.1 FL (ref 9.4–12.4)
POTASSIUM REFLEX MAGNESIUM: 3.7 MEQ/L (ref 3.5–5.2)
PROTEIN UA: ABNORMAL
PROTEIN, URINE: NEGATIVE MG/DL
RBC # BLD: 4.34 MILL/MM3 (ref 4.2–5.4)
RBC URINE: ABNORMAL /HPF
RENAL EPITHELIAL, UA: ABNORMAL
SEG NEUTROPHILS: 55 %
SEGMENTED NEUTROPHILS ABSOLUTE COUNT: 3.2 THOU/MM3 (ref 1.8–7.7)
SODIUM BLD-SCNC: 142 MEQ/L (ref 135–145)
SPECIFIC GRAVITY, URINE: > 1.03 (ref 1–1.03)
SPECIFIC GRAVITY, URINE: >= 1.03 (ref 1–1.03)
TOTAL PROTEIN: 7.3 G/DL (ref 6.1–8)
UROBILINOGEN, URINE: 0.2 EU/DL (ref 0–1)
UROBILINOGEN, URINE: 1 EU/DL (ref 0–1)
WBC # BLD: 5.9 THOU/MM3 (ref 4.8–10.8)
WBC UA: ABNORMAL /HPF
YEAST: ABNORMAL

## 2022-06-30 PROCEDURE — 80053 COMPREHEN METABOLIC PANEL: CPT

## 2022-06-30 PROCEDURE — 6360000004 HC RX CONTRAST MEDICATION: Performed by: EMERGENCY MEDICINE

## 2022-06-30 PROCEDURE — G8427 DOCREV CUR MEDS BY ELIG CLIN: HCPCS | Performed by: NURSE PRACTITIONER

## 2022-06-30 PROCEDURE — 1036F TOBACCO NON-USER: CPT | Performed by: NURSE PRACTITIONER

## 2022-06-30 PROCEDURE — 99213 OFFICE O/P EST LOW 20 MIN: CPT | Performed by: NURSE PRACTITIONER

## 2022-06-30 PROCEDURE — 85025 COMPLETE CBC W/AUTO DIFF WBC: CPT

## 2022-06-30 PROCEDURE — 2580000003 HC RX 258: Performed by: EMERGENCY MEDICINE

## 2022-06-30 PROCEDURE — 74177 CT ABD & PELVIS W/CONTRAST: CPT

## 2022-06-30 PROCEDURE — 76830 TRANSVAGINAL US NON-OB: CPT

## 2022-06-30 PROCEDURE — 1111F DSCHRG MED/CURRENT MED MERGE: CPT | Performed by: NURSE PRACTITIONER

## 2022-06-30 PROCEDURE — 93975 VASCULAR STUDY: CPT

## 2022-06-30 PROCEDURE — 3017F COLORECTAL CA SCREEN DOC REV: CPT | Performed by: NURSE PRACTITIONER

## 2022-06-30 PROCEDURE — 86140 C-REACTIVE PROTEIN: CPT

## 2022-06-30 PROCEDURE — 83690 ASSAY OF LIPASE: CPT

## 2022-06-30 PROCEDURE — 83605 ASSAY OF LACTIC ACID: CPT

## 2022-06-30 PROCEDURE — 99285 EMERGENCY DEPT VISIT HI MDM: CPT

## 2022-06-30 PROCEDURE — 81001 URINALYSIS AUTO W/SCOPE: CPT

## 2022-06-30 PROCEDURE — G8420 CALC BMI NORM PARAMETERS: HCPCS | Performed by: NURSE PRACTITIONER

## 2022-06-30 RX ORDER — ONDANSETRON 4 MG/1
4 TABLET, ORALLY DISINTEGRATING ORAL EVERY 8 HOURS PRN
Qty: 20 TABLET | Refills: 0 | Status: SHIPPED | OUTPATIENT
Start: 2022-06-30

## 2022-06-30 RX ORDER — 0.9 % SODIUM CHLORIDE 0.9 %
1000 INTRAVENOUS SOLUTION INTRAVENOUS ONCE
Status: COMPLETED | OUTPATIENT
Start: 2022-06-30 | End: 2022-06-30

## 2022-06-30 RX ADMIN — IOPAMIDOL 80 ML: 755 INJECTION, SOLUTION INTRAVENOUS at 19:25

## 2022-06-30 RX ADMIN — SODIUM CHLORIDE 1000 ML: 9 INJECTION, SOLUTION INTRAVENOUS at 19:04

## 2022-06-30 ASSESSMENT — PAIN DESCRIPTION - FREQUENCY
FREQUENCY: CONTINUOUS
FREQUENCY: CONTINUOUS

## 2022-06-30 ASSESSMENT — PAIN DESCRIPTION - DESCRIPTORS: DESCRIPTORS: BURNING

## 2022-06-30 ASSESSMENT — ENCOUNTER SYMPTOMS
BACK PAIN: 0
NAUSEA: 1
EYE REDNESS: 0
TROUBLE SWALLOWING: 0
SHORTNESS OF BREATH: 0
CONSTIPATION: 0
VOMITING: 0
DIARRHEA: 0
ABDOMINAL PAIN: 1
COUGH: 0

## 2022-06-30 ASSESSMENT — PAIN DESCRIPTION - ONSET
ONSET: GRADUAL
ONSET: GRADUAL

## 2022-06-30 ASSESSMENT — PAIN DESCRIPTION - ORIENTATION: ORIENTATION: LEFT

## 2022-06-30 ASSESSMENT — PAIN DESCRIPTION - LOCATION: LOCATION: ABDOMEN

## 2022-06-30 ASSESSMENT — PAIN DESCRIPTION - PAIN TYPE: TYPE: SURGICAL PAIN

## 2022-06-30 ASSESSMENT — PAIN - FUNCTIONAL ASSESSMENT: PAIN_FUNCTIONAL_ASSESSMENT: 0-10

## 2022-06-30 ASSESSMENT — PAIN SCALES - GENERAL: PAINLEVEL_OUTOF10: 8

## 2022-06-30 NOTE — PROGRESS NOTES
SUBJECTIVE:    Katerin Beatty is a 59 y.o. y/o female that presents with Abdominal Pain and Lower Back Pain  . Reports severe abdominal and lower back pain  Rates pain \"50 out of 10\"  Says she is not eating much  S/p cholecystectomy and small bowel resection 5/27/22  Came in a couple weeks ago with pain, KUB was negative, followed up with Gemma Cervantes the following week, was feeling better  Saw him again last week, was doing well  Over the weekend began having severe stabbing pains, nausea, decreased appetite  Pain meds aren't helping  Denies bloody stools or urine  No vomiting  No fever, chills  Denies urinary symptoms  Has not notified Dr. Anjali Delarosa of change in her condition    Review of Systems  Constitutional:   Negative for  chills, fever and changes in weight      OBJECTIVE:  /78   Pulse 91   Temp 97.7 °F (36.5 °C) (Infrared)   Resp 16   Ht 5' 2\" (1.575 m)   Wt 129 lb 9.6 oz (58.8 kg)   SpO2 100%   BMI 23.70 kg/m²   General Appearance: well developed and well- nourished, in no acute distressAggravating factors:   Pulmonary/Chest: clear to auscultation bilaterally- no wheezes, rales or rhonchi, normal air movement, no respiratory distress  Cardiovascular: normal rate, regular rhythm, normal S1 and S2, no murmurs, rubs, clicks, or gallops, distal pulses intact, no carotid bruits  Abdomen: bowel sounds hypoactive, some distention, diffuse pain and tenderness  Extremities: no cyanosis, clubbing or edema  Musculoskeletal: No joint swelling or gross deformity   Psych:  Normal affect without evidence of depression or anxiety  Skin: warm and dry, no rash or erythema      ASSESSMENT & PLAN  [employer] was seen today for abdominal pain and lower back pain.     Diagnoses and all orders for this visit:    Generalized abdominal pain    Other orders  -     POCT Urinalysis No Micro (Auto)    diffuse tenderness, stabbing sharp pains all over abdomen  Urine negative in the office  Needs further work-up  discussed stat CT vs ER  She declines both, says she has to go back to work, its her last day before changing jobs and she has to finish the day out  Gets off at 5:00, will go to the ER at that time    No follow-ups on file.     -Sx consistent with abd pain  -Referred to ER  -ER precautions given today  -Patient advised to contact our office immediately if symptoms worsen or persist  -Patient counseled on conservative care including PO intake, rest and OTC meds      Electronically signed by EULOGIO Sin CNP on 6/30/2022 at 1:43 PM

## 2022-06-30 NOTE — ED NOTES
Pt resting on cot. No visible signs of distress noted. Denies needs at this time. Will continue to monitor for safety and comfort.       Marcos Mcgraw RN  06/30/22 1946

## 2022-06-30 NOTE — ED TRIAGE NOTES
Pt to ED via private vehicle w/rprts of left lower abdominal pain that wraps around to the left flank, intermittent since bowel resection and now constant. Seen by pcp today and instructed to f/u in ER for possible CT scan. Pt alert and oriented x4. Breathing easy and unlabored on RA. Vitals updated.

## 2022-07-01 NOTE — ED PROVIDER NOTES
[address] \[facility]\"" [address] [address] EMERGENCY DEPT    EMERGENCY MEDICINE     Pt Name: Evelin Mariscal  MRN: [id number]  [place] 1965  Date of evaluation: 2022  Provider: Nida Ro MD,     [address] [place] [address]       Chief Complaint   Patient presents with    Abdominal Pain       HISTORY OF PRESENT ILLNESS    Evelin Mariscal is a pleasant 59 y.o. female who presents to the emergency department from home for evaluation of abdominal pain. Patient states that she had her gallbladder removed, lysis of adhesions, and bowel resection on May 27 by Dr. Rizwana Moore. She states that she has had intermittent pain since the surgery. It had been getting better but over the weekend she began having severe stabbing pains. The pain is in her lower abdomen and she states it radiates to the back. She has had nausea, decreased appetite however she denies vomiting, fever, chills, or constipation. She is having bowel movements and passing flatus. She was seen by her PCP earlier today who referred her here for further evaluation. Triage notes and Nursing notes were reviewed by myself. Any discrepancies are addressed above.     PAST MEDICAL HISTORY     Past Medical History:   Diagnosis Date    Anxiety     Depression     Dysphagia     Gallstones     Gastric ulcer     GERD (gastroesophageal reflux disease)     Headache(784.0)     Prolonged emergence from general anesthesia     Shortness of breath        SURGICAL HISTORY       Past Surgical History:   Procedure Laterality Date     SECTION      CHOLECYSTECTOMY, LAPAROSCOPIC N/A 2022    Laparoscopic Cholecystectomy with Laparoscopy Lysis of Adhesions Converted to Open with Small Segemental Bowel Resection performed by Patterson Bosworth, MD at [place] [address]  2013   [facility] [address]      Dr. Larry Aaron    CYST REMOVAL  1982    ovarian    HYSTERECTOMY (CERVIX STATUS UNKNOWN)  2004    KNEE SURGERY      UPPER GASTROINTESTINAL ENDOSCOPY 2012    UPPER GASTROINTESTINAL ENDOSCOPY  2022    Dr. Hema Genao       Discharge Medication List as of 2022 10:07 PM      CONTINUE these medications which have NOT CHANGED    Details   hyoscyamine (LEVSIN/SL) 125 MCG sublingual tablet DISSOLVE 1 TABLET UNDER TONGUE EVERY FOUR HOURS AS DIRECTED for abdominal painHistorical Med      pantoprazole sodium (PROTONIX) 40 MG PACK packet Take 40 mg by mouth every morning (before breakfast) Historical Med      Probiotic Product (PROBIOTIC-10) CHEW Take by mouthHistorical Med      naproxen (NAPROSYN) 500 MG tablet Take 1 tablet by mouth 2 times daily as needed for Pain, Disp-30 tablet, R-3      estradiol (ESTRACE) 1 MG tablet Take 1 tablet by mouth 2 times daily. , Disp-60 tablet, R-3             ALLERGIES     Adhesive tape, Biaxin [clarithromycin], Morphine, Nexium [esomeprazole magnesium], and Cortisone    FAMILY HISTORY       Family History   Problem Relation Age of Onset    High Blood Pressure Mother     Heart Disease Mother     Irritable Bowel Syndrome Father     Colon Polyps Father     Dementia Father     Irritable Bowel Syndrome Sister         SOCIAL HISTORY       Social History     Socioeconomic History    Marital status:       Spouse name: None    Number of children: None    Years of education: None    Highest education level: None   Occupational History    None   Tobacco Use    Smoking status: Former Smoker     Packs/day: 0.10     Years: 2.00     Pack years: 0.20     Types: Cigarettes     Quit date: 2021     Years since quittin.7    Smokeless tobacco: Never Used    Tobacco comment: Pt is a social smoker   Substance and Sexual Activity    Alcohol use: Yes     Comment: rarely    Drug use: No    Sexual activity: None   Other Topics Concern    None   Social History Narrative    None     Social Determinants of Health     Financial Resource Strain: Medium Risk    Difficulty of Paying Living Expenses: remainder of the review of systems was reviewed and is. PHYSICAL EXAM    (up to 7 for level 4, 8 or more for level 5)     ED Triage Vitals [06/30/22 1836]   BP Temp Temp Source Heart Rate Resp SpO2 Height Weight   114/82 98.6 °F (37 °C) Oral 83 17 98 % 5' 2\" (1.575 m) 129 lb (58.5 kg)       Physical Exam  Vitals and nursing note reviewed. Exam conducted with a chaperone present. Constitutional:       General: She is not in acute distress. Appearance: She is normal weight. She is not ill-appearing. HENT:      Head: Normocephalic and atraumatic. Nose: Nose normal. No congestion. Mouth/Throat:      Mouth: Mucous membranes are moist.      Pharynx: Oropharynx is clear. No oropharyngeal exudate or posterior oropharyngeal erythema. Eyes:      Extraocular Movements: Extraocular movements intact. Pupils: Pupils are equal, round, and reactive to light. Cardiovascular:      Rate and Rhythm: Normal rate and regular rhythm. Pulses: Normal pulses. Heart sounds: No murmur heard. No friction rub. Pulmonary:      Effort: Pulmonary effort is normal. No respiratory distress. Breath sounds: Normal breath sounds. No wheezing. Abdominal:      General: Abdomen is flat. A surgical scar is present. There is no distension. Palpations: Abdomen is soft. Tenderness: There is generalized abdominal tenderness. There is no guarding or rebound. Negative signs include Carlton's sign and McBurney's sign. Musculoskeletal:         General: Normal range of motion. Skin:     General: Skin is warm and dry. Capillary Refill: Capillary refill takes less than 2 seconds. Neurological:      General: No focal deficit present. Mental Status: She is alert and oriented to person, place, and time.          DIAGNOSTIC RESULTS     EKG:(none if blank)  All EKG's are interpreted by [facility] Physician who either signs or Co-signs this chart in the absence of a cardiologist.        RADIOLOGY: (none if blank)   Interpretation per the Radiologistbelow, if available at the time of this note:    [address]   Final Result   1. Prior hysterectomy. 2. Ovaries have a normal appearance. Doppler flow is seen to both ovaries    and no ovarian torsion identified at this time      This document has been electronically signed by: Jamaica Solorzano MD on    06/30/2022 09:27 PM      US DUP ABD PEL RETRO [address]   Final Result      1. Prior hysterectomy. 2. Ovaries have a normal appearance. Doppler flow is seen to both ovaries    and no ovarian torsion identified at this time      This document has been electronically signed by: Jamaica Solorzano MD on    06/30/2022 09:27 PM      Interpreted by:   Jamaica Solorzano MD      Signed by:   Jamaica Solorzano MD   6/30/22      Final result         **This report has been created using voice recognition software. It may contain minor errors which are inherent in voice recognition technology. **      Final report electronically signed by Dr. Kami Lu on 7/1/2022 7:07 AM      CT ABDOMEN PELVIS W IV CONTRAST Additional Contrast? None   Final Result   Impression:   1. Urinary bladder wall thickening may be due to under distention versus    cystitis. No bladder stones. 2. Small bowel anastomosis in the right lower quadrant. No bowel    obstruction. 3. Prior hysterectomy. Possible 1.7 cm right ovarian cyst. Pelvic    ultrasound can be performed for further evaluation if clinically desired      This document has been electronically signed by: Jamaica Solorzano MD on    06/30/2022 07:52 PM      All CTs at this facility use dose modulation techniques and iterative    reconstructions, and/or weight-based dosing   when appropriate to reduce radiation to a low as reasonably achievable.           LABS:  Labs Reviewed   COMPREHENSIVE METABOLIC PANEL W/ REFLEX TO MG FOR LOW K - Abnormal; Notable for the following components:       Result Value    Glucose 134 (*)     CO2 22 (*)     All other components within normal limits   LIPASE - Abnormal; Notable for the following components:    Lipase 58.5 (*)     All other components within normal limits   C-REACTIVE PROTEIN - Abnormal; Notable for the following components:    CRP 3.31 (*)     All other components within normal limits   URINE WITH REFLEXED MICRO - Abnormal; Notable for the following components:    Ketones, Urine TRACE (*)     Specific Gravity, Urine > 1.030 (*)     Protein, UA TRACE (*)     Character, Urine CLOUDY (*)     All other components within normal limits   ANION GAP - Abnormal; Notable for the following components:    Anion Gap 17.0 (*)     All other components within normal limits   GLOMERULAR FILTRATION RATE, ESTIMATED - Abnormal; Notable for the following components:    Est, Glom Filt Rate 73 (*)     All other components within normal limits   CBC WITH AUTO DIFFERENTIAL   LACTATE, SEPSIS   OSMOLALITY       All other labs were within normal range or not returned as of this dictation. Please note, any cultures that may have been sent were not resulted at the time of this patient visit. EMERGENCY DEPARTMENT COURSE andMedical Decision Making:     MDM  Number of Diagnoses or Management Options  Generalized abdominal pain  Diagnosis management comments: 49-year-old female presents emergency room for abdominal pain 1 month postoperative. Differential includes small bowel obstruction, volvulus, intra-abdominal abscess, colitis, diverticulitis, anastomosis leak, constipation, UTI. Will evaluate with CBC, CMP, lipase, lactate, CT abdomen pelvis, UA. Will give IV fluids. I offered the patient pain medicine and nausea medicine in which she stated that she was okay at this moment and did not require any medication. /  ED Course as of 07/02/22 0040   Thu Jun 30, 2022 2025 CT is negative for obstruction or intra-abdominal abscess.   He does note a right-sided ovarian cyst which he recommended evaluation with ultrasound if there was concern. Given that she has had such severe pain will order the ultrasound. Lab work reveals no leukocytosis or significant electrolyte abnormality. He does have an elevated CRP however this could be expected in the postoperative period. [DD]      ED Course User Index  [DD] Mora Dominguez MD         The patient was evaluated during the global COVID-19 pandemic, and that diagnosis was considered upon their initial presentation. Their evaluation, treatment and testing was consistent with current guidelines for patients who present with complaints or symptoms that may be related to COVID-19. Strict returnprecautions and follow up instructions were discussed with the patient with which the patient agrees        ED Medications administered this visit:    Medications   0.9 % sodium chloride bolus (0 mLs IntraVENous Stopped 6/30/22 2054)   iopamidol (ISOVUE-370) 76 % injection 80 mL (80 mLs IntraVENous Given 6/30/22 1925)         Procedures: (None if blank)       CLINICAL       1.  Generalized abdominal pain          DISPOSITION/PLAN   DISPOSITION        PATIENT REFERRED TO:  Radha Bobo MD  [place] [address]  [address] [place]  [phone]    Schedule an appointment as soon as possible for a visit       [facility] [address] [address] EMERGENCY DEPT  [address]  Go to   If symptoms worsen    Nehemiah Parra DO  [address] [facility] [address] [place]  [phone]            DISCHARGE MEDICATIONS:  Discharge Medication List as of 6/30/2022 10:07 PM      START taking these medications    Details   ondansetron (ZOFRAN ODT) 4 MG disintegrating tablet Take 1 tablet by mouth every 8 hours as needed for Nausea, Disp-20 tablet, R-0Normal                    (Please note that portions of this note were completed with a voice recognition program.  Efforts were made to edit the dictations but occasionallywords are mis-transcribed.)      Electronically signed by Bobby Bryson MD on 6/30/22 at 8:19 PM EDT    Attending Physician, Emergency Department       Rosalio Mcdonald MD  07/02/22 0040

## 2022-07-01 NOTE — ED NOTES
Dr. Elfreda Gowers at bedside to update on results and new orders. No visible signs of distress noted. Will continue to monitor for safety and comfort.       Lukas Lim RN  06/30/22 2054

## 2022-07-01 NOTE — ED NOTES
Pt off unit to US. Desiree; Marcel Perez at bedside. Denies needs at this time.       Raleigh Najjar, RN  06/30/22 2055

## 2022-07-01 NOTE — ED PROVIDER NOTES
Received patient in signout from Dr. Татьяна Aragon. Patient with a history of laparoscopic cholecystectomy with bowel perforation requiring small bowel resection. Patient continuing to have abdominal pain worse in the upper areas. CT abdomen/pelvis unremarkable other than possible ovarian cyst and recommended ultrasound. Ultrasound pending at this time. If ultrasound is unremarkable, patient can likely be discharged. ASSESSMENT:   Ultrasound shows prior hysterectomy, ovaries with normal appearance with Doppler flow to both ovaries and no torsion. On my evaluation of the patient, she states that she wants to be discharged. Pain has overall improved. She reports some nausea at home but denies any at this time and declined any Zofran at this time. Went over results with the patient. She was instructed to follow-up with Dr. Pepito Mathew for any continued abdominal pain as well as her GI doctor to see if there is any other etiology for her symptoms. Patient given prescription for Zofran at home in the case of any recurrent nausea. Strict return precautions were discussed with the patient including any new or worsening symptoms, severe abdominal pain, any intractable vomiting, or inability to have a bowel movement. Patient verbalized agreement and understanding with this plan and was discharged in stable condition.     Final impression: Upper abdominal pain, nausea  Disposition: Discharged to home in stable condition     Tanna Mora MD  07/01/22 8549

## 2022-07-01 NOTE — ED NOTES
Received report from [facility]. Pt resting in bed at this time, VSS. Pt states pain is \"not bad, pretty mild\". Fiance remains bedside.          Mickey Knight  06/30/22 2125

## 2022-12-07 DIAGNOSIS — R23.2 HOT FLASHES: ICD-10-CM

## 2022-12-07 DIAGNOSIS — Z78.0 MENOPAUSE: ICD-10-CM

## 2022-12-07 RX ORDER — PANTOPRAZOLE SODIUM 40 MG/1
40 GRANULE, DELAYED RELEASE ORAL
Qty: 30 EACH | Refills: 3 | Status: SHIPPED | OUTPATIENT
Start: 2022-12-07

## 2022-12-07 RX ORDER — ESTRADIOL 1 MG/1
1 TABLET ORAL 2 TIMES DAILY
Qty: 60 TABLET | Refills: 3 | Status: SHIPPED | OUTPATIENT
Start: 2022-12-07

## 2022-12-07 NOTE — TELEPHONE ENCOUNTER
----- Message from Kirsten Esparza sent at 12/7/2022  3:13 PM EST -----  Regarding: Need refills  I need refills of the protonix (generic) and estradoil 2mg. The estradoil should be the tiny green tablets as I cannot take the big round white ones. I use the [address] on [address] [facility].   Thank you

## 2022-12-12 NOTE — TELEPHONE ENCOUNTER
Patient states that the estradiol needs to be 2 mg tablets (the green pills) not the 1 mg tablet (white tgablet makes her sick)  And both of the requested prescriptions need to be 90 day supply

## 2023-04-04 ENCOUNTER — TELEPHONE (OUTPATIENT)
Dept: FAMILY MEDICINE CLINIC | Age: 58
End: 2023-04-04

## 2023-04-04 NOTE — TELEPHONE ENCOUNTER
----- Message from Dionte Esparza sent at 4/4/2023  9:27 AM EDT -----  Regarding: Appointment for Mom  Contact: [phone]  Hi. I've been trying to call the number [phone] to schedule my mother an appointment and it just rings and rings and then hangs up. Can someone call me at [phone] please?

## 2023-04-20 ENCOUNTER — TELEPHONE (OUTPATIENT)
Dept: FAMILY MEDICINE CLINIC | Age: 58
End: 2023-04-20

## 2023-04-20 DIAGNOSIS — J18.9 PNEUMONIA OF LEFT UPPER LOBE DUE TO INFECTIOUS ORGANISM: Primary | ICD-10-CM

## 2023-04-20 RX ORDER — LEVOFLOXACIN 750 MG/1
750 TABLET ORAL DAILY
Qty: 5 TABLET | Refills: 0 | Status: SHIPPED | OUTPATIENT
Start: 2023-04-20 | End: 2023-04-20 | Stop reason: ALTCHOICE

## 2023-04-20 NOTE — PROGRESS NOTES
Please call pt pharmacy and cancel levaquin  She wrote a message in on her chart about her mother   So I placed the rx in the wrong chart.

## 2023-08-23 ENCOUNTER — HOSPITAL ENCOUNTER (EMERGENCY)
Age: 58
Discharge: HOME | End: 2023-08-23
Payer: COMMERCIAL

## 2023-08-23 ENCOUNTER — TELEPHONE (OUTPATIENT)
Dept: FAMILY MEDICINE CLINIC | Age: 58
End: 2023-08-23

## 2023-08-23 VITALS
BODY MASS INDEX: 25.49 KG/M2 | SYSTOLIC BLOOD PRESSURE: 132 MMHG | DIASTOLIC BLOOD PRESSURE: 84 MMHG | HEART RATE: 107 BPM | WEIGHT: 135 LBS | HEIGHT: 61 IN | TEMPERATURE: 98.5 F | OXYGEN SATURATION: 98 % | RESPIRATION RATE: 16 BRPM

## 2023-08-23 DIAGNOSIS — H10.022 PINK EYE DISEASE OF LEFT EYE: ICD-10-CM

## 2023-08-23 DIAGNOSIS — J01.10 ACUTE FRONTAL SINUSITIS, RECURRENCE NOT SPECIFIED: Primary | ICD-10-CM

## 2023-08-23 PROCEDURE — 99213 OFFICE O/P EST LOW 20 MIN: CPT

## 2023-08-23 RX ORDER — AMOXICILLIN AND CLAVULANATE POTASSIUM 875; 125 MG/1; MG/1
1 TABLET, FILM COATED ORAL 2 TIMES DAILY
Qty: 20 TABLET | Refills: 0 | Status: SHIPPED | OUTPATIENT
Start: 2023-08-23 | End: 2023-09-02

## 2023-08-23 RX ORDER — POLYMYXIN B SULFATE AND TRIMETHOPRIM 1; 10000 MG/ML; [USP'U]/ML
1 SOLUTION OPHTHALMIC EVERY 4 HOURS
Qty: 10 ML | Refills: 0 | Status: SHIPPED | OUTPATIENT
Start: 2023-08-23 | End: 2023-08-30

## 2023-08-23 ASSESSMENT — ENCOUNTER SYMPTOMS
ABDOMINAL PAIN: 0
VOMITING: 0
SINUS PRESSURE: 1
DIARRHEA: 0
SHORTNESS OF BREATH: 0
EYE ITCHING: 1
NAUSEA: 0
SORE THROAT: 1
EYE DISCHARGE: 1
COUGH: 1
EYE REDNESS: 1
SINUS PAIN: 1

## 2023-08-23 ASSESSMENT — PAIN - FUNCTIONAL ASSESSMENT: PAIN_FUNCTIONAL_ASSESSMENT: NONE - DENIES PAIN

## 2023-08-23 NOTE — TELEPHONE ENCOUNTER
Called pt to schedule her an appointment due to her being sick. Pt told me \" Umm im not gonna get an appointment. Im just going to go (somewhere she mumbled and could not understand where she was going, even asked her to repeat it) over my lunch. It is ridiculous that I try to call and get an appoinment and then the phone just keeps ringing and no one answer. You guys need to do something about this. This is just crazy and its a hindrance to the patients since they can not get through. \"     I responded to the pt \" I am sorry about that. Our calls go  to a call center in [place]\"     Pt \" well that needs to be changed. I think we are going to just changes doctors because we  can never get through and when we do call the doctors office we only have a certain amount of time we can call. We cant sit on the phone all day waiting on someone to answer\"     I apologized to the patient again and she replied \" thank you for calling back but I will just go on my lunch.  Thank you bye\"

## 2023-08-23 NOTE — ED PROVIDER NOTES
[facility]       Chief Complaint   Patient presents with    Nasal Congestion    Eye Drainage     left       Nurses Notes reviewed and I agree except as noted in the HPI. HISTORY OF PRESENT ILLNESS   Ananya Reyes is a 59 y.o. female who presents to the urgent care for evaluation. She has been sick with sinus pain, congestion and pressure that started Friday. Symptoms are worsening. Morning she woke up to her left eye being crusty red and itching with drainage. Over-the-counter cold and cough medications not improving symptoms. The patient/patient representative has no other acute complaints at this time. REVIEW OF SYSTEMS     Review of Systems   Constitutional:  Negative for chills, fatigue and fever. HENT:  Positive for congestion, sinus pressure, sinus pain and sore throat. Negative for ear pain. Eyes:  Positive for discharge, redness and itching. Negative for visual disturbance. Respiratory:  Positive for cough. Negative for shortness of breath. Cardiovascular:  Negative for chest pain. Gastrointestinal:  Negative for abdominal pain, diarrhea, nausea and vomiting. Skin:  Negative for rash. Allergic/Immunologic: Negative for environmental allergies and food allergies. Neurological:  Negative for headaches. Hematological:  Negative for adenopathy. PAST MEDICAL HISTORY         Diagnosis Date    Anxiety     Depression     Dysphagia     Gallstones     Gastric ulcer     GERD (gastroesophageal reflux disease)     Headache(784.0)     Prolonged emergence from general anesthesia     Shortness of breath        SURGICAL HISTORY     Patient  has a past surgical history that includes Hysterectomy (2004);  section; knee surgery; cyst removal (1982); Upper gastrointestinal endoscopy (2012); Colonoscopy (, ); Upper gastrointestinal endoscopy (2022);  Colonoscopy (); and

## 2023-08-23 NOTE — TELEPHONE ENCOUNTER
Appointment Request From: Janny Alaniz      With Provider: Lorenzo Love, APRN - CNP [address] [place].      Preferred Date Range: 8/23/2023 - 8/23/2023      Preferred Times: Any Time      Reason for visit: Request an Appointment      Comments:   I am sick Been trying to call your officeall it does is ring.   [phone]

## 2023-08-23 NOTE — DISCHARGE INSTRUCTIONS
Suggestions for symptom management that are available over the counter. If you have questions regarding allergies or contraindications to use, please speak to the pharmacy staff or your family provider. For fevers or pain: acetaminophen (Tylenol), ibuprofen (Motrin)  For dry cough: medications containing dextromethorphan, such as Delsym, Robitussin DM or Mucinex DM and medicated throat lozenges  For congestion or sinus pressure: decongestant nasal sprays, such as Afrin (for up to 3 days), medications containing guaifenesin to help break up mucus, such as Mucinex or Robitussin, nasal steroid sprays, such as Flonase, Sensimist, Rhinocort or Nasonex and saline nasal sprays, neti pot or sinus rinse bottle  For runny nose, sneezing or watery/itchy eyes: less sedating antihistamines, such as loratidine (Claritin), fexofenadine (Allegra) or Cetirizine (Zyrtec) and antihistamine eye drops, such as ketotifen (Zaditor, Alaway) or olopatadine (Pataday)  For sore throat:  Chloraseptic throat spray or sore throat lozenges or  Mucinex Instasoothe Sore Throat & Pain Cherry Spray  If you have high blood pressure, a brand like Coricidin HBP may be an option. you should avoid medications containing pseudoephedrine or phenylephrine, such as Sudafed,  running a humidifier in your bedroom may be helpful for many of your symptoms. If your cough is keeping you awake at night, you can try raising your head with an extra pillow. If the skin around your nose and lips becomes sore, you can put some petroleum jelly on the area. Suggestions for over-the-counter supplements to help your immune system, if you have questions regarding allergies or contraindications to use, please speak to the pharmacy staff or your family provider.     Multi-vitamin/multi-mineral daily   Vitamin D3 3000 IU daily  Zinc 30 mg daily  Vitamin C 1000 mg twice daily  B-100 complex as daily as directed on bottle  Probiotic/Prebiotic bhat  Gargle with

## 2023-08-25 ENCOUNTER — HOSPITAL ENCOUNTER (OUTPATIENT)
Age: 58
Discharge: HOME | End: 2023-08-25
Payer: COMMERCIAL

## 2023-08-25 ENCOUNTER — PATIENT MESSAGE (OUTPATIENT)
Dept: FAMILY MEDICINE CLINIC | Age: 58
End: 2023-08-25

## 2023-08-25 DIAGNOSIS — R09.81 CONGESTION OF NASAL SINUS: Primary | ICD-10-CM

## 2023-08-25 DIAGNOSIS — Z20.822 ENCOUNTER FOR LABORATORY TESTING FOR COVID-19 VIRUS: ICD-10-CM

## 2023-08-25 DIAGNOSIS — R09.81 CONGESTION OF NASAL SINUS: ICD-10-CM

## 2023-08-25 LAB
FLUAV RNA RESP QL NAA+PROBE: NOT DETECTED
FLUBV RNA RESP QL NAA+PROBE: NOT DETECTED
SARS-COV-2 RNA RESP QL NAA+PROBE: NOT DETECTED

## 2023-08-25 PROCEDURE — 87636 SARSCOV2 & INF A&B AMP PRB: CPT

## 2023-08-25 NOTE — TELEPHONE ENCOUNTER
Pt called and would like an order put in so that she can just get the covid test done at [facility] urgent [facility]

## 2023-09-25 NOTE — TELEPHONE ENCOUNTER
Recent Visits  Date Type Provider Dept   06/30/22 Office Visit EULOGIO Pritchard CNPx [address]   06/09/22 Office Visit EULOGIO Pritchard CNP  [address] recent visits within past 540 days with a meds authorizing provider and meeting all other requirements  Future Appointments  No visits were found meeting these conditions.   Showing future appointments within next 150 days with a meds authorizing provider and meeting all other requirements

## 2023-09-26 RX ORDER — PANTOPRAZOLE SODIUM 40 MG/1
40 TABLET, DELAYED RELEASE ORAL
Qty: 90 TABLET | Refills: 3 | Status: SHIPPED | OUTPATIENT
Start: 2023-09-26

## 2024-11-04 ENCOUNTER — OFFICE VISIT (OUTPATIENT)
Dept: RHEUMATOLOGY | Age: 59
End: 2024-11-04
Payer: COMMERCIAL

## 2024-11-04 VITALS
DIASTOLIC BLOOD PRESSURE: 74 MMHG | HEIGHT: 61 IN | BODY MASS INDEX: 26.51 KG/M2 | OXYGEN SATURATION: 95 % | WEIGHT: 140.4 LBS | SYSTOLIC BLOOD PRESSURE: 110 MMHG | HEART RATE: 78 BPM

## 2024-11-04 DIAGNOSIS — L40.9 PSORIASIS OF NAIL: ICD-10-CM

## 2024-11-04 DIAGNOSIS — L40.3 PSORIASIS PALMARIS: ICD-10-CM

## 2024-11-04 DIAGNOSIS — M25.50 MULTIPLE JOINT PAIN: Primary | ICD-10-CM

## 2024-11-04 PROCEDURE — 99204 OFFICE O/P NEW MOD 45 MIN: CPT | Performed by: INTERNAL MEDICINE

## 2024-11-04 RX ORDER — GLIMEPIRIDE 2 MG/1
TABLET ORAL
COMMUNITY
Start: 2024-10-13

## 2024-11-04 RX ORDER — CELECOXIB 200 MG/1
200 CAPSULE ORAL DAILY PRN
Qty: 30 CAPSULE | Refills: 2 | Status: SHIPPED | OUTPATIENT
Start: 2024-11-04

## 2024-11-04 RX ORDER — MULTIVIT-MIN/IRON/FOLIC ACID/K 18-600-40
2000 CAPSULE ORAL DAILY
COMMUNITY

## 2024-11-04 ASSESSMENT — ENCOUNTER SYMPTOMS
SHORTNESS OF BREATH: 0
COUGH: 0
ABDOMINAL PAIN: 0
VOMITING: 0
NAUSEA: 0

## 2024-11-04 NOTE — PROGRESS NOTES
[facility]   Rheumatology Clinic Note      11/4/2024       Reason for Consult:  psoriatic arthritis  Requesting Physician:  Gilda Edmondson APRN - *     CHIEF COMPLAINT:    Chief Complaint   Patient presents with    New Patient     Arthropathic Psoriasis     Joint Pain     Pt recently Dx with Diabetes  Seen Dermatologist due to dry and cracking skin ( Dr Godoy). First initial visit patient was Dx with psoriatic arthritis.   No symptoms at this time. Patient would like to have a second opinion.   Severe wrist and hand pain, patient is unsure if it could be carpal tunnel. Low back pain about once a week.            HISTORY OF PRESENT ILLNESS:    59 y.o. female presents today for evaluation of psoriatic arthritis.    Reports developing skin changes in her hands that started October 2023. This progressively worsen in May. Developed cracking and fissuring of the skin on the fingers. These areas of the skin was very painful. Was managing with topical moisturizers with little relief. Saw dermatology who diagnosed her with psoriasis. Was also having an area of plaque psoriasis on the scalp. Reports that the dermatologist also suggested that she has psoriatic arthritis since she is experiencing joint pain. Plan was to start a biologic agent vs methotrexate. Pt was reluctant due to concerns of side effects.    Reports that the skin has improved. Still has rough areas on the index finger bilateral. But no fissuring or cracking. Noted some nail changes.   States that in April 2024, she was diagnosed with diabetes. Had her diet change and had improvement in her diabetes and skin changes.     Pain is manageable at this time.  Has pain in the lower back. This is most pronounced upon awakening and improves with movement. Morning stiffness lasts for few minutes. Pain sometimes wake her up from sleep.  Episodes of pain and swelling in the wrists.   Aleve usually helps her pain.    Reports being diagnosed with rheumatoid

## 2024-11-09 ENCOUNTER — HOSPITAL ENCOUNTER (OUTPATIENT)
Age: 59
Discharge: HOME | End: 2024-11-09
Payer: COMMERCIAL

## 2024-11-09 DIAGNOSIS — M25.50 MULTIPLE JOINT PAIN: ICD-10-CM

## 2024-11-09 LAB
CRP SERPL-MCNC: < 0.3 MG/DL (ref 0–1)
ERYTHROCYTE [SEDIMENTATION RATE] IN BLOOD BY WESTERGREN METHOD: 1 MM/HR (ref 0–20)
RHEUMATOID FACT SERPL-ACNC: 11 IU/ML (ref 0–13)

## 2024-11-09 PROCEDURE — 86200 CCP ANTIBODY: CPT

## 2024-11-09 PROCEDURE — 86430 RHEUMATOID FACTOR TEST QUAL: CPT

## 2024-11-09 PROCEDURE — 85651 RBC SED RATE NONAUTOMATED: CPT

## 2024-11-09 PROCEDURE — 86140 C-REACTIVE PROTEIN: CPT

## 2024-11-09 PROCEDURE — 36415 COLL VENOUS BLD VENIPUNCTURE: CPT

## 2024-11-11 LAB — CYCLIC CITRULLINATED PEPTIDE ANTIBODY IGG: 0.9 U/ML (ref 0–7)

## 2025-03-29 ENCOUNTER — LAB (OUTPATIENT)
Dept: LAB | Age: 60
End: 2025-03-29

## 2025-03-29 LAB
ALBUMIN SERPL BCG-MCNC: 4.2 G/DL (ref 3.4–4.9)
ALP SERPL-CCNC: 60 U/L (ref 35–104)
ALT SERPL W/O P-5'-P-CCNC: 15 U/L (ref 10–35)
ANION GAP SERPL CALC-SCNC: 12 MEQ/L (ref 8–16)
AST SERPL-CCNC: 19 U/L (ref 10–35)
BASOPHILS ABSOLUTE: 0 THOU/MM3 (ref 0–0.1)
BASOPHILS NFR BLD AUTO: 0.7 %
BILIRUB SERPL-MCNC: 0.4 MG/DL (ref 0.3–1.2)
BUN SERPL-MCNC: 13 MG/DL (ref 8–23)
CALCIUM SERPL-MCNC: 8.9 MG/DL (ref 8.8–10.2)
CHLORIDE SERPL-SCNC: 106 MEQ/L (ref 98–111)
CHOLEST SERPL-MCNC: 240 MG/DL (ref 100–199)
CO2 SERPL-SCNC: 23 MEQ/L (ref 22–29)
CREAT SERPL-MCNC: 0.8 MG/DL (ref 0.5–0.9)
CREAT UR-MCNC: 129 MG/DL
DEPRECATED MEAN GLUCOSE BLD GHB EST-ACNC: 141 MG/DL (ref 70–126)
DEPRECATED RDW RBC AUTO: 42.9 FL (ref 35–45)
EOSINOPHIL NFR BLD AUTO: 1.2 %
EOSINOPHILS ABSOLUTE: 0.1 THOU/MM3 (ref 0–0.4)
ERYTHROCYTE [DISTWIDTH] IN BLOOD BY AUTOMATED COUNT: 13.4 % (ref 11.5–14.5)
GFR SERPL CREATININE-BSD FRML MDRD: 83 ML/MIN/1.73M2
GLUCOSE SERPL-MCNC: 149 MG/DL (ref 74–109)
HBA1C MFR BLD HPLC: 6.7 % (ref 4–6)
HCT VFR BLD AUTO: 41.5 % (ref 37–47)
HDLC SERPL-MCNC: 50 MG/DL
HGB BLD-MCNC: 14.2 GM/DL (ref 12–16)
IMM GRANULOCYTES # BLD AUTO: 0.05 THOU/MM3 (ref 0–0.07)
IMM GRANULOCYTES NFR BLD AUTO: 1.2 %
LDLC SERPL CALC-MCNC: 134 MG/DL
LYMPHOCYTES ABSOLUTE: 1.2 THOU/MM3 (ref 1–4.8)
LYMPHOCYTES NFR BLD AUTO: 28.5 %
MCH RBC QN AUTO: 30.1 PG (ref 26–33)
MCHC RBC AUTO-ENTMCNC: 34.2 GM/DL (ref 32.2–35.5)
MCV RBC AUTO: 88.1 FL (ref 81–99)
MICROALBUMIN UR-MCNC: < 2 MG/DL
MICROALBUMIN/CREAT RATIO PNL UR: 16 MG/G (ref 0–30)
MONOCYTES ABSOLUTE: 0.3 THOU/MM3 (ref 0.4–1.3)
MONOCYTES NFR BLD AUTO: 7.7 %
NEUTROPHILS ABSOLUTE: 2.5 THOU/MM3 (ref 1.8–7.7)
NEUTROPHILS NFR BLD AUTO: 60.7 %
NRBC BLD AUTO-RTO: 0 /100 WBC
PLATELET # BLD AUTO: 222 THOU/MM3 (ref 130–400)
PMV BLD AUTO: 11.9 FL (ref 9.4–12.4)
POTASSIUM SERPL-SCNC: 4.1 MEQ/L (ref 3.5–5.2)
PROT SERPL-MCNC: 6.6 G/DL (ref 6.4–8.3)
RBC # BLD AUTO: 4.71 MILL/MM3 (ref 4.2–5.4)
SODIUM SERPL-SCNC: 141 MEQ/L (ref 135–145)
TRIGL SERPL-MCNC: 282 MG/DL (ref 0–199)
WBC # BLD AUTO: 4.2 THOU/MM3 (ref 4.8–10.8)

## 2025-06-26 ENCOUNTER — OFFICE VISIT (OUTPATIENT)
Dept: FAMILY MEDICINE CLINIC | Age: 60
End: 2025-06-26
Payer: COMMERCIAL

## 2025-06-26 VITALS
DIASTOLIC BLOOD PRESSURE: 72 MMHG | RESPIRATION RATE: 16 BRPM | HEIGHT: 61 IN | HEART RATE: 81 BPM | TEMPERATURE: 97.6 F | OXYGEN SATURATION: 98 % | BODY MASS INDEX: 26.85 KG/M2 | SYSTOLIC BLOOD PRESSURE: 112 MMHG | WEIGHT: 142.2 LBS

## 2025-06-26 DIAGNOSIS — E11.65 TYPE 2 DIABETES MELLITUS WITH HYPERGLYCEMIA, WITHOUT LONG-TERM CURRENT USE OF INSULIN (HCC): Primary | ICD-10-CM

## 2025-06-26 LAB — HBA1C MFR BLD: 6.9 % (ref 4.3–5.7)

## 2025-06-26 PROCEDURE — 99215 OFFICE O/P EST HI 40 MIN: CPT | Performed by: NURSE PRACTITIONER

## 2025-06-26 PROCEDURE — 3044F HG A1C LEVEL LT 7.0%: CPT | Performed by: NURSE PRACTITIONER

## 2025-06-26 RX ORDER — GLIMEPIRIDE 2 MG/1
2 TABLET ORAL
Qty: 90 TABLET | Refills: 3 | Status: SHIPPED | OUTPATIENT
Start: 2025-06-26

## 2025-06-26 RX ORDER — KETOROLAC TROMETHAMINE 30 MG/ML
1 INJECTION, SOLUTION INTRAMUSCULAR; INTRAVENOUS DAILY
Qty: 1 EACH | Refills: 0 | Status: SHIPPED | OUTPATIENT
Start: 2025-06-26

## 2025-06-26 RX ORDER — HYDROCHLOROTHIAZIDE 12.5 MG/1
1 CAPSULE ORAL
Qty: 6 EACH | Refills: 3 | Status: SHIPPED | OUTPATIENT
Start: 2025-06-26

## 2025-06-26 ASSESSMENT — PATIENT HEALTH QUESTIONNAIRE - PHQ9
SUM OF ALL RESPONSES TO PHQ QUESTIONS 1-9: 0
2. FEELING DOWN, DEPRESSED OR HOPELESS: NOT AT ALL
1. LITTLE INTEREST OR PLEASURE IN DOING THINGS: NOT AT ALL

## 2025-06-26 NOTE — PROGRESS NOTES
Minna Esparza is a 60 y.o. female thatpresents for New Patient      History obtained today from Patient.    History of Present Illness    Diabetes Type 2    Glucose control:   Does patient check blood glucoses at home?  Yes  Report of hypoglycemia: non  Lab Results   Component Value Date    LABA1C 6.9 (H) 06/26/2025     Lab Results   Component Value Date     (H) 03/29/2025       Symptoms  Polyuria, Polydipsia or Polyphagia?   Yes  Chest Pain, SOB, or Palpitations? -  No  New Vision complaints? No  Paresthesias of the extremities?  No    Medications  Current medication were reviewed.  Compliant with medications?  yes  Medication side effects?  No  On ACE-I or ARB?  No  On antiplatelet therapy?  No  On Statin?  No        Exercise  Exercise? Trying to stay active  Wt Readings from Last 3 Encounters:   06/26/25 64.5 kg (142 lb 3.2 oz)   11/04/24 63.7 kg (140 lb 6.4 oz)   08/23/23 61.2 kg (135 lb)       Diet discipline?:  Low salt, fat, sugar diet?  yes    Blood pressure control:  BP Readings from Last 3 Encounters:   06/26/25 112/72   11/04/24 110/74   08/23/23 132/84       No components found for: \"LABMICR\", \"[id number]\"    No results found for: \"LDLDIRECT\"            I have reviewed the patient's past medical history, past surgical history, allergies,medications, social and family history and I have made updates where appropriate.    Past Medical History:   Diagnosis Date    Anxiety     Depression     Diabetes (HCC)     Dysphagia     Gallstones     Gastric ulcer     GERD (gastroesophageal reflux disease)     Headache(784.0)     Prolonged emergence from general anesthesia     Shortness of breath        Social History     Tobacco Use    Smoking status: Former     Current packs/day: 0.00     Average packs/day: 0.1 packs/day for 2.0 years (0.2 ttl pk-yrs)     Types: Cigarettes     Start date: 9/28/2019     Quit date: 9/28/2021     Years since quitting: 3.7    Smokeless tobacco: Never    Tobacco comments:     Pt is a

## 2025-06-27 ENCOUNTER — TELEPHONE (OUTPATIENT)
Dept: FAMILY MEDICINE CLINIC | Age: 60
End: 2025-06-27

## 2025-06-27 NOTE — TELEPHONE ENCOUNTER
Patient said she called [facility] last night and was told the don't script for her Glimepiride. I told her we sent it yesterday.

## 2025-08-04 ENCOUNTER — TELEPHONE (OUTPATIENT)
Dept: FAMILY MEDICINE CLINIC | Age: 60
End: 2025-08-04

## (undated) DEVICE — TROCAR: Brand: KII FIOS FIRST ENTRY

## (undated) DEVICE — PUMP SUC IRR TBNG L10FT W/ HNDPC ASSEMB STRYKEFLOW 2

## (undated) DEVICE — TROCAR: Brand: KII SHIELDED BLADED ACCESS SYSTEM

## (undated) DEVICE — GLOVE SURG SZ 7.5 L11.73IN FNGR THK9.8MIL STRW LTX POLYMER

## (undated) DEVICE — INSUFFLATION NEEDLE TO ESTABLISH PNEUMOPERITONEUM.: Brand: INSUFFLATION NEEDLE

## (undated) DEVICE — UNIVERSAL MONOPOLAR LAPAROSCOPIC CABLE 10FT, 4MM PIN CONNECTOR: Brand: CONMED

## (undated) DEVICE — APPLIER CLP L SHFT DIA12MM 20 ROT MULT LIGACLP

## (undated) DEVICE — SUTURE VCRL + SZ 2-0 L27IN ABSRB VLT L26MM CT-2 1/2 CIR VCP333H

## (undated) DEVICE — GENERAL LAPAROSCOPY PACK-LF: Brand: MEDLINE INDUSTRIES, INC.

## (undated) DEVICE — INTRODUCER CATH L3.5IN DIA7.5FR FOR CHOLANGIOGRAPHY TAUT

## (undated) DEVICE — Z DUPLICATE USE 2431315 SET INSUF TBNG HI FLO W/ SMK EVAC FOR PNEUMOCLEAR

## (undated) DEVICE — STAPLER INT L55MM CUT LN L53MM STPL LN L57MM BLU B FRM 8

## (undated) DEVICE — BANDAGE ADH W1XL3IN NAT FAB WVN FLX DURABLE N ADH PD SEAL

## (undated) DEVICE — BAG SPEC REM 224ML W4XL6IN DIA10MM 1 HND GYN DISP ENDOPCH

## (undated) DEVICE — APPLIER CLP M L L11.4IN DIA10MM ENDOSCP ROT MULT FOR LIG

## (undated) DEVICE — SOLUTION ANTIFOG VIS SYS CLEARIFY LAPSCP

## (undated) DEVICE — TROCAR: Brand: KII® SLEEVE

## (undated) DEVICE — RELOAD STPL L55MM OPN H3.8MM CLS H1.5MM WIRE DIA0.2MM REG

## (undated) DEVICE — STAPLER INT L60MM REG TISS BLU B FRM 8 FIRING 2 ROW AUTO

## (undated) DEVICE — CATHETER CHOLGM 4.5FR L18IN TIP 5.5FR W/ MTL SUPP TB TAUT

## (undated) DEVICE — SEALANT FIBRIN 10 CC VISTASEAL